# Patient Record
Sex: FEMALE | Race: WHITE | NOT HISPANIC OR LATINO | Employment: OTHER | URBAN - NONMETROPOLITAN AREA
[De-identification: names, ages, dates, MRNs, and addresses within clinical notes are randomized per-mention and may not be internally consistent; named-entity substitution may affect disease eponyms.]

---

## 2023-07-03 ENCOUNTER — HOSPITAL ENCOUNTER (INPATIENT)
Facility: HOSPITAL | Age: 48
LOS: 5 days | Discharge: HOME | DRG: 383 | End: 2023-07-08
Attending: EMERGENCY MEDICINE | Admitting: FAMILY MEDICINE
Payer: COMMERCIAL

## 2023-07-03 ENCOUNTER — APPOINTMENT (EMERGENCY)
Dept: RADIOLOGY | Facility: HOSPITAL | Age: 48
DRG: 383 | End: 2023-07-03
Payer: COMMERCIAL

## 2023-07-03 ENCOUNTER — APPOINTMENT (INPATIENT)
Dept: RADIOLOGY | Facility: HOSPITAL | Age: 48
DRG: 383 | End: 2023-07-03
Payer: COMMERCIAL

## 2023-07-03 DIAGNOSIS — R06.00 DYSPNEA: ICD-10-CM

## 2023-07-03 DIAGNOSIS — J96.22 ACUTE ON CHRONIC RESPIRATORY FAILURE WITH HYPERCAPNIA (HCC): ICD-10-CM

## 2023-07-03 DIAGNOSIS — L03.011 CELLULITIS OF RIGHT FINGER: ICD-10-CM

## 2023-07-03 DIAGNOSIS — J44.1 COPD EXACERBATION (HCC): Primary | ICD-10-CM

## 2023-07-03 DIAGNOSIS — I73.00 RAYNAUD'S DISEASE: ICD-10-CM

## 2023-07-03 LAB
2HR DELTA HS TROPONIN: 1 NG/L
ALBUMIN SERPL BCP-MCNC: 3.7 G/DL (ref 3.5–5)
ALP SERPL-CCNC: 54 U/L (ref 34–104)
ALT SERPL W P-5'-P-CCNC: 4 U/L (ref 7–52)
ANION GAP SERPL CALCULATED.3IONS-SCNC: 8 MMOL/L
APTT PPP: 28 SECONDS (ref 23–37)
AST SERPL W P-5'-P-CCNC: 24 U/L (ref 13–39)
BASE EX.OXY STD BLDV CALC-SCNC: 66.8 % (ref 60–80)
BASE EXCESS BLDV CALC-SCNC: 4.4 MMOL/L
BASOPHILS # BLD AUTO: 0.03 THOUSANDS/ÂΜL (ref 0–0.1)
BASOPHILS NFR BLD AUTO: 1 % (ref 0–1)
BILIRUB SERPL-MCNC: 0.36 MG/DL (ref 0.2–1)
BNP SERPL-MCNC: 133 PG/ML (ref 0–100)
BUN SERPL-MCNC: 16 MG/DL (ref 5–25)
CALCIUM SERPL-MCNC: 8.9 MG/DL (ref 8.4–10.2)
CARDIAC TROPONIN I PNL SERPL HS: 8 NG/L
CARDIAC TROPONIN I PNL SERPL HS: 9 NG/L
CHLORIDE SERPL-SCNC: 102 MMOL/L (ref 96–108)
CO2 SERPL-SCNC: 31 MMOL/L (ref 21–32)
CREAT SERPL-MCNC: 0.75 MG/DL (ref 0.6–1.3)
CRP SERPL QL: 7.8 MG/L
D DIMER PPP FEU-MCNC: 1.4 UG/ML FEU
EOSINOPHIL # BLD AUTO: 0.15 THOUSAND/ÂΜL (ref 0–0.61)
EOSINOPHIL NFR BLD AUTO: 3 % (ref 0–6)
ERYTHROCYTE [DISTWIDTH] IN BLOOD BY AUTOMATED COUNT: 19 % (ref 11.6–15.1)
FLUAV RNA RESP QL NAA+PROBE: NEGATIVE
FLUBV RNA RESP QL NAA+PROBE: NEGATIVE
GFR SERPL CREATININE-BSD FRML MDRD: 94 ML/MIN/1.73SQ M
GLUCOSE SERPL-MCNC: 151 MG/DL (ref 65–140)
HCO3 BLDV-SCNC: 32.8 MMOL/L (ref 24–30)
HCT VFR BLD AUTO: 49.5 % (ref 34.8–46.1)
HGB BLD-MCNC: 14.2 G/DL (ref 11.5–15.4)
IMM GRANULOCYTES # BLD AUTO: 0.02 THOUSAND/UL (ref 0–0.2)
IMM GRANULOCYTES NFR BLD AUTO: 0 % (ref 0–2)
INR PPP: 0.89 (ref 0.84–1.19)
LACTATE SERPL-SCNC: 1.6 MMOL/L (ref 0.5–2)
LYMPHOCYTES # BLD AUTO: 1.55 THOUSANDS/ÂΜL (ref 0.6–4.47)
LYMPHOCYTES NFR BLD AUTO: 31 % (ref 14–44)
MAGNESIUM SERPL-MCNC: 2.3 MG/DL (ref 1.9–2.7)
MCH RBC QN AUTO: 24.1 PG (ref 26.8–34.3)
MCHC RBC AUTO-ENTMCNC: 28.7 G/DL (ref 31.4–37.4)
MCV RBC AUTO: 84 FL (ref 82–98)
MONOCYTES # BLD AUTO: 0.46 THOUSAND/ÂΜL (ref 0.17–1.22)
MONOCYTES NFR BLD AUTO: 9 % (ref 4–12)
NEUTROPHILS # BLD AUTO: 2.83 THOUSANDS/ÂΜL (ref 1.85–7.62)
NEUTS SEG NFR BLD AUTO: 56 % (ref 43–75)
NRBC BLD AUTO-RTO: 0 /100 WBCS
O2 CT BLDV-SCNC: 13.9 ML/DL
PCO2 BLDV: 65.8 MM HG (ref 42–50)
PH BLDV: 7.32 [PH] (ref 7.3–7.4)
PLATELET # BLD AUTO: 123 THOUSANDS/UL (ref 149–390)
PO2 BLDV: 38.8 MM HG (ref 35–45)
POTASSIUM SERPL-SCNC: 4.3 MMOL/L (ref 3.5–5.3)
PROCALCITONIN SERPL-MCNC: <0.05 NG/ML
PROT SERPL-MCNC: 7.3 G/DL (ref 6.4–8.4)
PROTHROMBIN TIME: 12.1 SECONDS (ref 11.6–14.5)
RBC # BLD AUTO: 5.88 MILLION/UL (ref 3.81–5.12)
RSV RNA RESP QL NAA+PROBE: NEGATIVE
SARS-COV-2 RNA RESP QL NAA+PROBE: NEGATIVE
SODIUM SERPL-SCNC: 141 MMOL/L (ref 135–147)
WBC # BLD AUTO: 5.04 THOUSAND/UL (ref 4.31–10.16)

## 2023-07-03 PROCEDURE — 71045 X-RAY EXAM CHEST 1 VIEW: CPT

## 2023-07-03 PROCEDURE — 83605 ASSAY OF LACTIC ACID: CPT | Performed by: EMERGENCY MEDICINE

## 2023-07-03 PROCEDURE — 82805 BLOOD GASES W/O2 SATURATION: CPT | Performed by: EMERGENCY MEDICINE

## 2023-07-03 PROCEDURE — 96374 THER/PROPH/DIAG INJ IV PUSH: CPT

## 2023-07-03 PROCEDURE — 94640 AIRWAY INHALATION TREATMENT: CPT

## 2023-07-03 PROCEDURE — 87040 BLOOD CULTURE FOR BACTERIA: CPT | Performed by: EMERGENCY MEDICINE

## 2023-07-03 PROCEDURE — 0241U HB NFCT DS VIR RESP RNA 4 TRGT: CPT | Performed by: EMERGENCY MEDICINE

## 2023-07-03 PROCEDURE — 85025 COMPLETE CBC W/AUTO DIFF WBC: CPT | Performed by: EMERGENCY MEDICINE

## 2023-07-03 PROCEDURE — 84484 ASSAY OF TROPONIN QUANT: CPT | Performed by: EMERGENCY MEDICINE

## 2023-07-03 PROCEDURE — 86140 C-REACTIVE PROTEIN: CPT | Performed by: EMERGENCY MEDICINE

## 2023-07-03 PROCEDURE — 99285 EMERGENCY DEPT VISIT HI MDM: CPT | Performed by: EMERGENCY MEDICINE

## 2023-07-03 PROCEDURE — 99223 1ST HOSP IP/OBS HIGH 75: CPT

## 2023-07-03 PROCEDURE — 83880 ASSAY OF NATRIURETIC PEPTIDE: CPT | Performed by: EMERGENCY MEDICINE

## 2023-07-03 PROCEDURE — 80053 COMPREHEN METABOLIC PANEL: CPT | Performed by: EMERGENCY MEDICINE

## 2023-07-03 PROCEDURE — 36415 COLL VENOUS BLD VENIPUNCTURE: CPT | Performed by: EMERGENCY MEDICINE

## 2023-07-03 PROCEDURE — 99285 EMERGENCY DEPT VISIT HI MDM: CPT

## 2023-07-03 PROCEDURE — 85730 THROMBOPLASTIN TIME PARTIAL: CPT | Performed by: EMERGENCY MEDICINE

## 2023-07-03 PROCEDURE — 85379 FIBRIN DEGRADATION QUANT: CPT

## 2023-07-03 PROCEDURE — 84145 PROCALCITONIN (PCT): CPT | Performed by: EMERGENCY MEDICINE

## 2023-07-03 PROCEDURE — 73120 X-RAY EXAM OF HAND: CPT

## 2023-07-03 PROCEDURE — 93005 ELECTROCARDIOGRAM TRACING: CPT

## 2023-07-03 PROCEDURE — 85610 PROTHROMBIN TIME: CPT | Performed by: EMERGENCY MEDICINE

## 2023-07-03 PROCEDURE — 83735 ASSAY OF MAGNESIUM: CPT | Performed by: EMERGENCY MEDICINE

## 2023-07-03 RX ORDER — ACETAMINOPHEN 325 MG/1
650 TABLET ORAL EVERY 6 HOURS PRN
Status: DISCONTINUED | OUTPATIENT
Start: 2023-07-03 | End: 2023-07-08 | Stop reason: HOSPADM

## 2023-07-03 RX ORDER — SILDENAFIL CITRATE 20 MG/1
20 TABLET ORAL 3 TIMES DAILY
Status: DISCONTINUED | OUTPATIENT
Start: 2023-07-03 | End: 2023-07-08 | Stop reason: HOSPADM

## 2023-07-03 RX ORDER — METHYLPREDNISOLONE SODIUM SUCCINATE 125 MG/2ML
80 INJECTION, POWDER, LYOPHILIZED, FOR SOLUTION INTRAMUSCULAR; INTRAVENOUS ONCE
Status: COMPLETED | OUTPATIENT
Start: 2023-07-03 | End: 2023-07-03

## 2023-07-03 RX ORDER — SODIUM CHLORIDE FOR INHALATION 0.9 %
3 VIAL, NEBULIZER (ML) INHALATION
Status: DISCONTINUED | OUTPATIENT
Start: 2023-07-04 | End: 2023-07-04

## 2023-07-03 RX ORDER — HYDROXYCHLOROQUINE SULFATE 200 MG/1
100 TABLET, FILM COATED ORAL 2 TIMES DAILY
Status: DISCONTINUED | OUTPATIENT
Start: 2023-07-04 | End: 2023-07-04

## 2023-07-03 RX ORDER — INSULIN LISPRO 100 [IU]/ML
1-6 INJECTION, SOLUTION INTRAVENOUS; SUBCUTANEOUS
Status: DISCONTINUED | OUTPATIENT
Start: 2023-07-04 | End: 2023-07-08 | Stop reason: HOSPADM

## 2023-07-03 RX ORDER — ALBUTEROL SULFATE 90 UG/1
2 AEROSOL, METERED RESPIRATORY (INHALATION) EVERY 6 HOURS PRN
COMMUNITY

## 2023-07-03 RX ORDER — METHYLPREDNISOLONE SODIUM SUCCINATE 40 MG/ML
40 INJECTION, POWDER, LYOPHILIZED, FOR SOLUTION INTRAMUSCULAR; INTRAVENOUS EVERY 8 HOURS
Status: DISCONTINUED | OUTPATIENT
Start: 2023-07-04 | End: 2023-07-06

## 2023-07-03 RX ORDER — IPRATROPIUM BROMIDE AND ALBUTEROL SULFATE 2.5; .5 MG/3ML; MG/3ML
3 SOLUTION RESPIRATORY (INHALATION) ONCE
Status: COMPLETED | OUTPATIENT
Start: 2023-07-03 | End: 2023-07-03

## 2023-07-03 RX ORDER — AMLODIPINE BESYLATE 5 MG/1
5 TABLET ORAL DAILY
COMMUNITY

## 2023-07-03 RX ORDER — LEVALBUTEROL INHALATION SOLUTION 1.25 MG/3ML
1.25 SOLUTION RESPIRATORY (INHALATION)
Status: DISCONTINUED | OUTPATIENT
Start: 2023-07-04 | End: 2023-07-08 | Stop reason: HOSPADM

## 2023-07-03 RX ORDER — AMLODIPINE BESYLATE 5 MG/1
5 TABLET ORAL DAILY
Status: DISCONTINUED | OUTPATIENT
Start: 2023-07-04 | End: 2023-07-08 | Stop reason: HOSPADM

## 2023-07-03 RX ORDER — SILDENAFIL CITRATE 20 MG/1
20 TABLET ORAL 3 TIMES DAILY
COMMUNITY

## 2023-07-03 RX ORDER — GUAIFENESIN 600 MG/1
600 TABLET, EXTENDED RELEASE ORAL 2 TIMES DAILY
Status: DISCONTINUED | OUTPATIENT
Start: 2023-07-04 | End: 2023-07-08 | Stop reason: HOSPADM

## 2023-07-03 RX ORDER — HEPARIN SODIUM 5000 [USP'U]/ML
5000 INJECTION, SOLUTION INTRAVENOUS; SUBCUTANEOUS EVERY 8 HOURS SCHEDULED
Status: DISCONTINUED | OUTPATIENT
Start: 2023-07-04 | End: 2023-07-08 | Stop reason: HOSPADM

## 2023-07-03 RX ORDER — VANCOMYCIN HYDROCHLORIDE 1 G/200ML
15 INJECTION, SOLUTION INTRAVENOUS EVERY 12 HOURS
Status: DISCONTINUED | OUTPATIENT
Start: 2023-07-04 | End: 2023-07-04

## 2023-07-03 RX ORDER — NICOTINE 21 MG/24HR
1 PATCH, TRANSDERMAL 24 HOURS TRANSDERMAL DAILY
Status: DISCONTINUED | OUTPATIENT
Start: 2023-07-04 | End: 2023-07-07

## 2023-07-03 RX ORDER — OXYCODONE HYDROCHLORIDE 5 MG/1
5 TABLET ORAL EVERY 4 HOURS PRN
Status: DISCONTINUED | OUTPATIENT
Start: 2023-07-03 | End: 2023-07-06

## 2023-07-03 RX ORDER — CEFEPIME HYDROCHLORIDE 2 G/50ML
2000 INJECTION, SOLUTION INTRAVENOUS EVERY 12 HOURS
Status: DISCONTINUED | OUTPATIENT
Start: 2023-07-04 | End: 2023-07-07

## 2023-07-03 RX ADMIN — SILDENAFIL 20 MG: 20 TABLET ORAL at 23:57

## 2023-07-03 RX ADMIN — METHYLPREDNISOLONE SODIUM SUCCINATE 80 MG: 125 INJECTION, POWDER, FOR SOLUTION INTRAMUSCULAR; INTRAVENOUS at 20:51

## 2023-07-03 RX ADMIN — AZITHROMYCIN MONOHYDRATE 500 MG: 500 INJECTION, POWDER, LYOPHILIZED, FOR SOLUTION INTRAVENOUS at 23:13

## 2023-07-03 RX ADMIN — IPRATROPIUM BROMIDE AND ALBUTEROL SULFATE 3 ML: 2.5; .5 SOLUTION RESPIRATORY (INHALATION) at 20:51

## 2023-07-04 LAB
4HR DELTA HS TROPONIN: 0 NG/L
ALBUMIN SERPL BCP-MCNC: 3.5 G/DL (ref 3.5–5)
ALP SERPL-CCNC: 46 U/L (ref 34–104)
ALT SERPL W P-5'-P-CCNC: 4 U/L (ref 7–52)
ANION GAP SERPL CALCULATED.3IONS-SCNC: 6 MMOL/L
ANISOCYTOSIS BLD QL SMEAR: PRESENT
AST SERPL W P-5'-P-CCNC: 8 U/L (ref 13–39)
BACTERIA UR QL AUTO: ABNORMAL /HPF
BASE EX.OXY STD BLDV CALC-SCNC: 89.4 % (ref 60–80)
BASE EXCESS BLDV CALC-SCNC: 0.6 MMOL/L
BASOPHILS # BLD MANUAL: 0 THOUSAND/UL (ref 0–0.1)
BASOPHILS NFR MAR MANUAL: 0 % (ref 0–1)
BILIRUB SERPL-MCNC: 0.25 MG/DL (ref 0.2–1)
BILIRUB UR QL STRIP: NEGATIVE
BUN SERPL-MCNC: 15 MG/DL (ref 5–25)
CALCIUM SERPL-MCNC: 8.9 MG/DL (ref 8.4–10.2)
CARDIAC TROPONIN I PNL SERPL HS: 8 NG/L
CHLORIDE SERPL-SCNC: 104 MMOL/L (ref 96–108)
CLARITY UR: CLEAR
CO2 SERPL-SCNC: 27 MMOL/L (ref 21–32)
COLOR UR: YELLOW
CREAT SERPL-MCNC: 0.63 MG/DL (ref 0.6–1.3)
EOSINOPHIL # BLD MANUAL: 0 THOUSAND/UL (ref 0–0.4)
EOSINOPHIL NFR BLD MANUAL: 0 % (ref 0–6)
ERYTHROCYTE [DISTWIDTH] IN BLOOD BY AUTOMATED COUNT: 18.1 % (ref 11.6–15.1)
GFR SERPL CREATININE-BSD FRML MDRD: 106 ML/MIN/1.73SQ M
GLUCOSE SERPL-MCNC: 151 MG/DL (ref 65–140)
GLUCOSE SERPL-MCNC: 204 MG/DL (ref 65–140)
GLUCOSE SERPL-MCNC: 211 MG/DL (ref 65–140)
GLUCOSE SERPL-MCNC: 213 MG/DL (ref 65–140)
GLUCOSE SERPL-MCNC: 257 MG/DL (ref 65–140)
GLUCOSE SERPL-MCNC: 302 MG/DL (ref 65–140)
GLUCOSE UR STRIP-MCNC: ABNORMAL MG/DL
HCO3 BLDV-SCNC: 28.8 MMOL/L (ref 24–30)
HCT VFR BLD AUTO: 44.5 % (ref 34.8–46.1)
HGB BLD-MCNC: 12.9 G/DL (ref 11.5–15.4)
HGB UR QL STRIP.AUTO: NEGATIVE
HYALINE CASTS #/AREA URNS LPF: ABNORMAL /LPF
KETONES UR STRIP-MCNC: NEGATIVE MG/DL
LEUKOCYTE ESTERASE UR QL STRIP: NEGATIVE
LG PLATELETS BLD QL SMEAR: PRESENT
LYMPHOCYTES # BLD AUTO: 0.28 THOUSAND/UL (ref 0.6–4.47)
LYMPHOCYTES # BLD AUTO: 7 % (ref 14–44)
MCH RBC QN AUTO: 24.4 PG (ref 26.8–34.3)
MCHC RBC AUTO-ENTMCNC: 29 G/DL (ref 31.4–37.4)
MCV RBC AUTO: 84 FL (ref 82–98)
MICROCYTES BLD QL AUTO: PRESENT
MONOCYTES # BLD AUTO: 0.04 THOUSAND/UL (ref 0–1.22)
MONOCYTES NFR BLD: 1 % (ref 4–12)
MUCOUS THREADS UR QL AUTO: ABNORMAL
NEUTROPHILS # BLD MANUAL: 3.65 THOUSAND/UL (ref 1.85–7.62)
NEUTS SEG NFR BLD AUTO: 92 % (ref 43–75)
NITRITE UR QL STRIP: POSITIVE
NON-SQ EPI CELLS URNS QL MICRO: ABNORMAL /HPF
O2 CT BLDV-SCNC: 17.7 ML/DL
PCO2 BLDV: 61.9 MM HG (ref 42–50)
PH BLDV: 7.29 [PH] (ref 7.3–7.4)
PH UR STRIP.AUTO: 5.5 [PH]
PLATELET # BLD AUTO: 199 THOUSANDS/UL (ref 149–390)
PLATELET BLD QL SMEAR: ADEQUATE
PMV BLD AUTO: 9.4 FL (ref 8.9–12.7)
PO2 BLDV: 69.4 MM HG (ref 35–45)
POTASSIUM SERPL-SCNC: 3.9 MMOL/L (ref 3.5–5.3)
PROCALCITONIN SERPL-MCNC: <0.05 NG/ML
PROT SERPL-MCNC: 6.5 G/DL (ref 6.4–8.4)
PROT UR STRIP-MCNC: ABNORMAL MG/DL
RBC # BLD AUTO: 5.28 MILLION/UL (ref 3.81–5.12)
RBC #/AREA URNS AUTO: ABNORMAL /HPF
RBC MORPH BLD: PRESENT
SODIUM SERPL-SCNC: 137 MMOL/L (ref 135–147)
SP GR UR STRIP.AUTO: >=1.03 (ref 1–1.03)
UROBILINOGEN UR QL STRIP.AUTO: 0.2 E.U./DL
WBC # BLD AUTO: 3.97 THOUSAND/UL (ref 4.31–10.16)
WBC #/AREA URNS AUTO: ABNORMAL /HPF
WBC TOXIC VACUOLES BLD QL SMEAR: PRESENT

## 2023-07-04 PROCEDURE — 80053 COMPREHEN METABOLIC PANEL: CPT

## 2023-07-04 PROCEDURE — 94640 AIRWAY INHALATION TREATMENT: CPT

## 2023-07-04 PROCEDURE — 84484 ASSAY OF TROPONIN QUANT: CPT | Performed by: EMERGENCY MEDICINE

## 2023-07-04 PROCEDURE — 99233 SBSQ HOSP IP/OBS HIGH 50: CPT | Performed by: FAMILY MEDICINE

## 2023-07-04 PROCEDURE — 87205 SMEAR GRAM STAIN: CPT

## 2023-07-04 PROCEDURE — 82948 REAGENT STRIP/BLOOD GLUCOSE: CPT

## 2023-07-04 PROCEDURE — 84145 PROCALCITONIN (PCT): CPT

## 2023-07-04 PROCEDURE — 94660 CPAP INITIATION&MGMT: CPT

## 2023-07-04 PROCEDURE — 85027 COMPLETE CBC AUTOMATED: CPT

## 2023-07-04 PROCEDURE — 87186 SC STD MICRODIL/AGAR DIL: CPT | Performed by: EMERGENCY MEDICINE

## 2023-07-04 PROCEDURE — 87077 CULTURE AEROBIC IDENTIFY: CPT | Performed by: EMERGENCY MEDICINE

## 2023-07-04 PROCEDURE — 87081 CULTURE SCREEN ONLY: CPT

## 2023-07-04 PROCEDURE — 82805 BLOOD GASES W/O2 SATURATION: CPT

## 2023-07-04 PROCEDURE — 94664 DEMO&/EVAL PT USE INHALER: CPT

## 2023-07-04 PROCEDURE — 94760 N-INVAS EAR/PLS OXIMETRY 1: CPT

## 2023-07-04 PROCEDURE — 81001 URINALYSIS AUTO W/SCOPE: CPT | Performed by: EMERGENCY MEDICINE

## 2023-07-04 PROCEDURE — 85007 BL SMEAR W/DIFF WBC COUNT: CPT

## 2023-07-04 PROCEDURE — 99255 IP/OBS CONSLTJ NEW/EST HI 80: CPT | Performed by: PHYSICIAN ASSISTANT

## 2023-07-04 PROCEDURE — 87070 CULTURE OTHR SPECIMN AEROBIC: CPT

## 2023-07-04 PROCEDURE — 87086 URINE CULTURE/COLONY COUNT: CPT | Performed by: EMERGENCY MEDICINE

## 2023-07-04 RX ORDER — CALCIUM CARBONATE 500 MG/1
500 TABLET, CHEWABLE ORAL DAILY PRN
Status: DISCONTINUED | OUTPATIENT
Start: 2023-07-04 | End: 2023-07-04

## 2023-07-04 RX ORDER — HYDROXYCHLOROQUINE SULFATE 200 MG/1
100 TABLET, FILM COATED ORAL 2 TIMES DAILY
Status: DISCONTINUED | OUTPATIENT
Start: 2023-07-04 | End: 2023-07-08 | Stop reason: HOSPADM

## 2023-07-04 RX ORDER — IPRATROPIUM BROMIDE AND ALBUTEROL SULFATE 2.5; .5 MG/3ML; MG/3ML
3 SOLUTION RESPIRATORY (INHALATION) EVERY 6 HOURS PRN
Status: DISCONTINUED | OUTPATIENT
Start: 2023-07-04 | End: 2023-07-08 | Stop reason: HOSPADM

## 2023-07-04 RX ORDER — INSULIN LISPRO 100 [IU]/ML
5 INJECTION, SOLUTION INTRAVENOUS; SUBCUTANEOUS
Status: DISCONTINUED | OUTPATIENT
Start: 2023-07-04 | End: 2023-07-07

## 2023-07-04 RX ORDER — OFLOXACIN 3 MG/ML
1 SOLUTION/ DROPS OPHTHALMIC 4 TIMES DAILY
Status: DISCONTINUED | OUTPATIENT
Start: 2023-07-04 | End: 2023-07-08 | Stop reason: HOSPADM

## 2023-07-04 RX ORDER — LIDOCAINE 50 MG/G
1 PATCH TOPICAL DAILY
Status: DISCONTINUED | OUTPATIENT
Start: 2023-07-04 | End: 2023-07-08 | Stop reason: HOSPADM

## 2023-07-04 RX ORDER — SIMETHICONE 80 MG
80 TABLET,CHEWABLE ORAL EVERY 6 HOURS PRN
Status: DISCONTINUED | OUTPATIENT
Start: 2023-07-04 | End: 2023-07-08 | Stop reason: HOSPADM

## 2023-07-04 RX ORDER — HYDROMORPHONE HCL IN WATER/PF 6 MG/30 ML
0.2 PATIENT CONTROLLED ANALGESIA SYRINGE INTRAVENOUS ONCE
Status: COMPLETED | OUTPATIENT
Start: 2023-07-04 | End: 2023-07-04

## 2023-07-04 RX ADMIN — CEFEPIME HYDROCHLORIDE 2000 MG: 2 INJECTION, SOLUTION INTRAVENOUS at 00:44

## 2023-07-04 RX ADMIN — OFLOXACIN 1 DROP: 3 SOLUTION OPHTHALMIC at 22:38

## 2023-07-04 RX ADMIN — GUAIFENESIN 600 MG: 600 TABLET ORAL at 08:03

## 2023-07-04 RX ADMIN — SILDENAFIL 20 MG: 20 TABLET ORAL at 08:03

## 2023-07-04 RX ADMIN — CALCIUM CARBONATE (ANTACID) CHEW TAB 500 MG 500 MG: 500 CHEW TAB at 17:03

## 2023-07-04 RX ADMIN — NICOTINE 1 PATCH: 14 PATCH, EXTENDED RELEASE TRANSDERMAL at 08:03

## 2023-07-04 RX ADMIN — LEVALBUTEROL HYDROCHLORIDE 1.25 MG: 1.25 SOLUTION RESPIRATORY (INHALATION) at 13:51

## 2023-07-04 RX ADMIN — IOHEXOL 100 ML: 350 INJECTION, SOLUTION INTRAVENOUS at 03:37

## 2023-07-04 RX ADMIN — INSULIN LISPRO 2 UNITS: 100 INJECTION, SOLUTION INTRAVENOUS; SUBCUTANEOUS at 16:54

## 2023-07-04 RX ADMIN — SILDENAFIL 20 MG: 20 TABLET ORAL at 22:24

## 2023-07-04 RX ADMIN — AMLODIPINE BESYLATE 5 MG: 5 TABLET ORAL at 08:03

## 2023-07-04 RX ADMIN — OXYCODONE HYDROCHLORIDE 5 MG: 5 TABLET ORAL at 18:19

## 2023-07-04 RX ADMIN — INSULIN LISPRO 3 UNITS: 100 INJECTION, SOLUTION INTRAVENOUS; SUBCUTANEOUS at 00:46

## 2023-07-04 RX ADMIN — HEPARIN SODIUM 5000 UNITS: 5000 INJECTION INTRAVENOUS; SUBCUTANEOUS at 22:29

## 2023-07-04 RX ADMIN — VANCOMYCIN HYDROCHLORIDE 1000 MG: 1 INJECTION, SOLUTION INTRAVENOUS at 01:26

## 2023-07-04 RX ADMIN — HEPARIN SODIUM 5000 UNITS: 5000 INJECTION INTRAVENOUS; SUBCUTANEOUS at 05:31

## 2023-07-04 RX ADMIN — LEVALBUTEROL HYDROCHLORIDE 1.25 MG: 1.25 SOLUTION RESPIRATORY (INHALATION) at 07:31

## 2023-07-04 RX ADMIN — METHYLPREDNISOLONE SODIUM SUCCINATE 40 MG: 40 INJECTION, POWDER, FOR SOLUTION INTRAMUSCULAR; INTRAVENOUS at 22:26

## 2023-07-04 RX ADMIN — METHYLPREDNISOLONE SODIUM SUCCINATE 40 MG: 40 INJECTION, POWDER, FOR SOLUTION INTRAMUSCULAR; INTRAVENOUS at 05:28

## 2023-07-04 RX ADMIN — HEPARIN SODIUM 5000 UNITS: 5000 INJECTION INTRAVENOUS; SUBCUTANEOUS at 12:59

## 2023-07-04 RX ADMIN — SIMETHICONE 80 MG: 80 TABLET, CHEWABLE ORAL at 18:26

## 2023-07-04 RX ADMIN — INSULIN LISPRO 1 UNITS: 100 INJECTION, SOLUTION INTRAVENOUS; SUBCUTANEOUS at 22:38

## 2023-07-04 RX ADMIN — HYDROXYCHLOROQUINE SULFATE 100 MG: 200 TABLET, FILM COATED ORAL at 09:50

## 2023-07-04 RX ADMIN — METHYLPREDNISOLONE SODIUM SUCCINATE 40 MG: 40 INJECTION, POWDER, FOR SOLUTION INTRAMUSCULAR; INTRAVENOUS at 12:11

## 2023-07-04 RX ADMIN — VANCOMYCIN HYDROCHLORIDE 1250 MG: 5 INJECTION, POWDER, LYOPHILIZED, FOR SOLUTION INTRAVENOUS at 09:50

## 2023-07-04 RX ADMIN — OXYCODONE HYDROCHLORIDE 5 MG: 5 TABLET ORAL at 07:19

## 2023-07-04 RX ADMIN — GUAIFENESIN 600 MG: 600 TABLET ORAL at 16:52

## 2023-07-04 RX ADMIN — HYDROMORPHONE HYDROCHLORIDE 0.2 MG: 0.2 INJECTION, SOLUTION INTRAMUSCULAR; INTRAVENOUS; SUBCUTANEOUS at 19:19

## 2023-07-04 RX ADMIN — INSULIN LISPRO 5 UNITS: 100 INJECTION, SOLUTION INTRAVENOUS; SUBCUTANEOUS at 16:55

## 2023-07-04 RX ADMIN — LIDOCAINE 1 PATCH: 50 PATCH CUTANEOUS at 16:10

## 2023-07-04 RX ADMIN — VANCOMYCIN HYDROCHLORIDE 1250 MG: 5 INJECTION, POWDER, LYOPHILIZED, FOR SOLUTION INTRAVENOUS at 22:28

## 2023-07-04 RX ADMIN — OXYCODONE HYDROCHLORIDE 5 MG: 5 TABLET ORAL at 00:06

## 2023-07-04 RX ADMIN — OXYCODONE HYDROCHLORIDE 5 MG: 5 TABLET ORAL at 14:26

## 2023-07-04 RX ADMIN — SILDENAFIL 20 MG: 20 TABLET ORAL at 15:33

## 2023-07-04 RX ADMIN — INSULIN LISPRO 2 UNITS: 100 INJECTION, SOLUTION INTRAVENOUS; SUBCUTANEOUS at 08:04

## 2023-07-04 RX ADMIN — OFLOXACIN 1 DROP: 3 SOLUTION OPHTHALMIC at 17:03

## 2023-07-04 RX ADMIN — HYDROXYCHLOROQUINE SULFATE 100 MG: 200 TABLET, FILM COATED ORAL at 16:55

## 2023-07-04 RX ADMIN — LEVALBUTEROL HYDROCHLORIDE 1.25 MG: 1.25 SOLUTION RESPIRATORY (INHALATION) at 20:29

## 2023-07-04 RX ADMIN — CEFEPIME HYDROCHLORIDE 2000 MG: 2 INJECTION, SOLUTION INTRAVENOUS at 11:29

## 2023-07-04 RX ADMIN — IPRATROPIUM BROMIDE AND ALBUTEROL SULFATE 3 ML: .5; 3 SOLUTION RESPIRATORY (INHALATION) at 09:55

## 2023-07-04 RX ADMIN — INSULIN LISPRO 4 UNITS: 100 INJECTION, SOLUTION INTRAVENOUS; SUBCUTANEOUS at 11:00

## 2023-07-04 NOTE — ASSESSMENT & PLAN NOTE
· Presents from home with shortness of breath . Per EMS received multiple nebulizer treatments pre hospital. Chronic respiratory failure on 3l NC at baseline. · Currently stable on baseline O2 3L   · VBG pH 7.31, CO2 65.8 (appears similar to ABG in June 2023 likely near baseline)   · Repeat VBG shows respiratory acidosis compensating.   · XR chest pending final read   · Discussed the case with on-call pulmonologist-recommending to place patient on BiPAP during sleeping hour with 16/8-continue IV steroid-pending formal discussion  · Critical care aware about this patient-since patient will need only BiPAP during sleep, does not recommends to upgrade level of care

## 2023-07-04 NOTE — PROGRESS NOTES
[address] [facility]  Progress Note  Name: Sruthi Martin  MRN: [id number]  Unit/Bed#: -01 I Date of Admission: 7/3/2023   Date of Service: 7/4/2023 I Hospital Day: 1    Assessment/Plan   Cellulitis of right index finger  Assessment & Plan  · Complicated history of Raynauds , smoking, multiple finger amputations in the past. Reportedly had partial amputation of right 2nd finger in [place] around May 2023. Recently admitted at [facility] in June 2023, had evaluation of right 2nd finger discoloration , appears was evaluated by Rheum and Orthopedics . No surgical intervention was recommended . Patient states she had follow-up outpatient appointment with orthopedics but was unable to get to her appointment    · Right 2nd finger with black/brown discoloration at the tip . No drainage noted. Mild swelling and erythema on exam (erythema appears similar to other fingers due to Raynaud's). Complains of pain that is not new or worsening. ·  Afebrile, no leukocytosis. · X-ray hand:Mildly comminuted fracture at the distal tuft of the right second digit. Erosive/resorptive changes of the distal tuft of the right fourth digit which may reflect phalangeal osteolysis given history of Raynaud's disease  · Appreciate orthopedics consult -recommendation is to transfer to tertiary center in the near future for hand surgery evaluation. As per chart review, patient recently evaluated at [facility], patient condition improved for blood culture shows bacteremia, at that time we will try to initiate transfer. The meantime we will try to stabilize patient COPD  · F/u blood cultures- Start cefepime and vancomyin for now   · Prn pain control  · Neurovascular checks     * Acute exacerbation of chronic obstructive pulmonary disease (COPD) ([address])  Assessment & Plan  · Presents from home with shortness of breath .  Per EMS received multiple nebulizer treatments pre hospital. Chronic respiratory failure on 3l NC at baseline. · Currently stable on baseline O2 3L   · VBG pH 7.31, CO2 65.8 (appears similar to ABG in June 2023 likely near baseline)   · Repeat VBG shows respiratory acidosis compensating. · XR chest pending final read   · Discussed the case with on-call pulmonologist-recommending to place patient on BiPAP during sleeping hour with 16/8-continue IV steroid-pending formal discussion  · Critical care aware about this patient-since patient will need only BiPAP during sleep, does not recommends to upgrade level of care    Chest pain  Assessment & Plan  · Complaining of chest tightness and shortness of breath  · Troponins negative thus far   · EKG nonischemic   · Recent echo June 2023 shows EF 55%  · Likely in the setting of acute COPD exacerbation   · CTAP no PE noted    Thrombocytopenia (HCC)  Assessment & Plan  · Platelet improving  · No active bleeding noted    Pre-diabetes  Assessment & Plan  · Reports now off Metformin   · Place on SSI while inpatient , monitor closely while on IV steroids     Chronic respiratory failure ([address])  Assessment & Plan  · Currently stable at baseline 3l NC  · In the setting of COPD   · Monitor respiratory status closely, continue treatment for acute COPD         Lupus ([address])  Assessment & Plan  · Recently moved from [facility] she is working on establishing care here   · Continue Plaquenil for now, monitor Platelets closely     Raynaud's disease  Assessment & Plan  · Current smoker , has had multiple fingers amputated in the past   · States hands fluctuate color, no new or worsening pain per patient  · Continue amlodipine , Sildenafil   · Smoking cessation encouraged   · Neurovascular checks   · We will try to switch amlodipine to nifedipine which also helps with Raynaud's             VTE Pharmacologic Prophylaxis: VTE Score: 5 High Risk (Score >/= 5) - Pharmacological DVT Prophylaxis Ordered: heparin. Sequential Compression Devices Ordered.     Patient Centered Rounds: I performed bedside rounds with nursing staff today. Discussions with Specialists or Other Care Team Provider: Orthopedic, pulmonary over the phone    Education and Discussions with Family / Patient: Updated  (son) via phone.-Left voice message for son. Total Time Spent on Date of Encounter in care of patient: 10 minutes This time was spent on one or more of the following: performing physical exam; counseling and coordination of care; obtaining or reviewing history; documenting in the medical record; reviewing/ordering tests, medications or procedures; communicating with other healthcare professionals and discussing with patient's family/caregivers. Current Length of Stay: 1 day(s)  Current Patient Status: Inpatient   Certification Statement: The patient will continue to require additional inpatient hospital stay due to To monitor above condition  Discharge Plan: Anticipate discharge in >72 hrs to To be due to mild    Code Status: Level 3 - DNAR and DNI    Subjective:   Seen and evaluated and examined. Patient lying on the bed, having some labored breathing after recent walk to the restroom. Denies any chest pain, nausea, vomiting. Denies any significant pain at this time in the hands or feet. But reports on and off basis especially during cold weather she feels more pain. Objective:     Vitals:   Temp (24hrs), Av.3 °F (36.8 °C), Min:97.8 °F (36.6 °C), Max:98.8 °F (37.1 °C)    Temp:  [97.8 °F (36.6 °C)-98.8 °F (37.1 °C)] 98.8 °F (37.1 °C)  HR:  [] 104  Resp:  [18-22] 18  BP: (120-155)/(70-87) 120/70  SpO2:  [91 %-96 %] 93 %  Body mass index is 32.35 kg/m². Input and Output Summary (last 24 hours): Intake/Output Summary (Last 24 hours) at 2023 1513  Last data filed at 2023 1409  Gross per 24 hour   Intake 800 ml   Output 1150 ml   Net -350 ml       Physical Exam:   Physical Exam  Vitals and nursing note reviewed. Exam conducted with a chaperone present.    Constitutional: General: She is in acute distress. Appearance: She is not diaphoretic. HENT:      Head: Normocephalic. Nose: Nose normal. No congestion or rhinorrhea. Mouth/Throat:      Mouth: Mucous membranes are moist.   Eyes:      General: No scleral icterus. Left eye: No discharge. Extraocular Movements: Extraocular movements intact. Conjunctiva/sclera: Conjunctivae normal.      Pupils: Pupils are equal, round, and reactive to light. Cardiovascular:      Rate and Rhythm: Tachycardia present. Heart sounds: No murmur heard. No friction rub. No gallop. Pulmonary:      Effort: Respiratory distress present. Breath sounds: No stridor. Rales present. No rhonchi. Abdominal:      General: Abdomen is flat. Bowel sounds are normal. There is no distension. Palpations: There is no mass. Tenderness: There is no abdominal tenderness. Hernia: No hernia is present. Musculoskeletal:      Comments: Dry gangrene noted on the tip of the index finger on the right hand. Skin:     Comments: Maller rash noted bilaterally   Neurological:      General: No focal deficit present. Mental Status: She is alert and oriented to person, place, and time. Cranial Nerves: No cranial nerve deficit. Sensory: No sensory deficit. Motor: No weakness.       Coordination: Coordination normal.         Additional Data:     Labs:  Results from last 7 days   Lab Units 07/04/23  0524 07/03/23  2040   WBC Thousand/uL 3.97* 5.04   HEMOGLOBIN g/dL 12.9 14.2   HEMATOCRIT % 44.5 49.5*   PLATELETS Thousands/uL 199 123*   NEUTROS PCT %  --  56   LYMPHS PCT %  --  31   LYMPHO PCT % 7*  --    MONOS PCT %  --  9   MONO PCT % 1*  --    EOS PCT % 0 3     Results from last 7 days   Lab Units 07/04/23  0524   SODIUM mmol/L 137   POTASSIUM mmol/L 3.9   CHLORIDE mmol/L 104   CO2 mmol/L 27   BUN mg/dL 15   CREATININE mg/dL 0.63   ANION GAP mmol/L 6   CALCIUM mg/dL 8.9   ALBUMIN g/dL 3.5   TOTAL BILIRUBIN mg/dL 0.25   ALK PHOS U/L 46   ALT U/L 4*   AST U/L 8*   GLUCOSE RANDOM mg/dL 213*     Results from last 7 days   Lab Units 07/03/23  2040   INR  0.89     Results from last 7 days   Lab Units 07/04/23  1048 07/04/23  0711 07/04/23  0019   POC GLUCOSE mg/dl 302* 204* 257*         Results from last 7 days   Lab Units 07/04/23  0524 07/03/23  2040   LACTIC ACID mmol/L  --  1.6   PROCALCITONIN ng/ml <0.05 <0.05       Lines/Drains:  Invasive Devices     Peripheral Intravenous Line  Duration           Peripheral IV 07/03/23 Left Antecubital <1 day                      Imaging: Reviewed radiology reports from this admission including: chest CT scan and xray(s)    Recent Cultures (last 7 days):         Last 24 Hours Medication List:   Current Facility-Administered Medications   Medication Dose Route Frequency Provider Last Rate   • acetaminophen  650 mg Oral Q6H PRN SAIDA Jane     • amLODIPine  5 mg Oral Daily SAIDA Jane     • cefepime  2,000 mg Intravenous Q12H SAIDA Jane 2,000 mg (07/04/23 1129)   • guaiFENesin  600 mg Oral BID SAIDA Jane     • heparin (porcine)  5,000 Units Subcutaneous Q8H [facility] SAIDA Jane     • hydroxychloroquine  100 mg Oral BID Malorie Wade MD     • insulin lispro  1-6 Units Subcutaneous TID [facility] SAIDA Jane     • insulin lispro  1-6 Units Subcutaneous HS SAIDA Jane     • insulin lispro  5 Units Subcutaneous TID With Meals Malorie Wade MD     • ipratropium-albuterol  3 mL Nebulization Q6H PRN Fara Piper MD     • levalbuterol  1.25 mg Nebulization TID SAIDA Jane     • methylPREDNISolone sodium succinate  40 mg Intravenous Q8H SAIDA Jane     • nicotine  1 patch Transdermal Daily SAIDA Jane     • oxyCODONE  5 mg Oral Q4H PRN SAIDA Jane     • sildenafil  20 mg Oral TID Paty Villarreal SAIDA Burroughs     • vancomycin  1,250 mg Intravenous Q12H Walker You MD 1,250 mg (07/04/23 3442)        Today, Patient Was Seen By: Walker You MD    **Please Note: This note may have been constructed using a voice recognition system. **

## 2023-07-04 NOTE — NURSING NOTE
Patient states the tums did not help her heartburn. She also states the lidoderm patch did not help her right finger pain. Dr. Maame Caruso made aware.

## 2023-07-04 NOTE — ASSESSMENT & PLAN NOTE
· Recently moved from [facility] [place] [facility] she is working on establishing care here   · Continue Plaquenil for now, monitor Platelets closely

## 2023-07-04 NOTE — ASSESSMENT & PLAN NOTE
· Presents from home with shortness of breath . Per EMS received multiple nebulizer treatments pre hospital. Chronic respiratory failure on 3l NC at baseline. · Currently stable on baseline O2 3L   · VBG pH 7.31, CO2 65.8 (appears similar to ABG in June 2023 likely near baseline)   · XR chest pending final read   · Appreciate pulmonary consult   · Continue IV Solu-medrol every 8 hours, Xopenex TID.    · Continue Cefepime/Vancomycin for now (due to possible right finger cellulitis - see plan)   · Repeat VBG in AM   · Pulmonary toilet  · Smoking cessation encouraged

## 2023-07-04 NOTE — NURSING NOTE
Respiratory therapist notified that the patient became SOB after ambulating to the BR with the O2 on. She was not due for any further treatments at this time. Respiratory therapist will adjust orders, and bring her a treatment now. Will have the patient use the [facility] when she needs to toilet.

## 2023-07-04 NOTE — ASSESSMENT & PLAN NOTE
· Complaining of chest tightness and shortness of breath  · Troponins negative thus far   · EKG nonischemic   · Recent echo June 2023 shows EF 55%  · Likely in the setting of acute COPD exacerbation   · Check d-dimer- consider CTA pe study if elevated

## 2023-07-04 NOTE — ASSESSMENT & PLAN NOTE
· Complicated history of Raynauds , smoking, multiple finger amputations in the past. Reportedly had partial amputation of right 2nd finger in [place] around May 2023. Recently admitted at [facility] in June 2023, had evaluation of right 2nd finger discoloration , appears was evaluated by Rheum and Orthopedics . No surgical intervention was recommended . Patient states she had follow-up outpatient appointment with orthopedics but was unable to get to her appointment    · Right 2nd finger with black/brown discoloration at the tip . No drainage noted. Mild swelling and erythema on exam (erythema appears similar to other fingers due to Raynaud's). Complains of pain that is not new or worsening. ·  Afebrile, no leukocytosis. · X-ray hand:Mildly comminuted fracture at the distal tuft of the right second digit. Erosive/resorptive changes of the distal tuft of the right fourth digit which may reflect phalangeal osteolysis given history of Raynaud's disease  · Appreciate orthopedics consult -recommendation is to transfer to tertiary center in the near future for hand surgery evaluation. As per chart review, patient recently evaluated at [facility], patient condition improved for blood culture shows bacteremia, at that time we will try to initiate transfer.   The meantime we will try to stabilize patient COPD  · F/u blood cultures- Start cefepime and vancomyin for now   · Prn pain control  · Neurovascular checks

## 2023-07-04 NOTE — ASSESSMENT & PLAN NOTE
· Complicated history of Raynauds , smoking, multiple finger amputations in the past. Reportedly had partial amputation of right 2nd finger in [place] around May 2023. Recently admitted at [facility] in June 2023, had evaluation of right 2nd finger discoloration , appears was evaluated by Rheum and Orthopedics . No surgical intervention was recommended . Patient states she had follow-up outpatient appointment with orthopedics but was unable to get to her appointment    · Right 2nd finger with black/brown discoloration at the tip . No drainage noted. Mild swelling and erythema on exam (erythema appears similar to other fingers due to Raynaud's). Complains of pain that is not new or worsening. ·  Afebrile, no leukocytosis.     · Check XR right hand to rule out gas or osteomyelitis   · Appreciate orthopedics consult    · F/u blood cultures- Start cefepime and vancomyin for now   · Prn pain control  · Neurovascular checks

## 2023-07-04 NOTE — PROGRESS NOTES
Jaren Metz is a 50 y.o. female who is currently ordered Vancomycin IV with management by the Pharmacy Consult service. Relevant clinical data and objective / subjective history reviewed. Vancomycin Assessment:  Indication and Goal AUC/Trough: Soft tissue (goal -600, trough >10)  Clinical Status: stable  Micro:     Renal Function:  SCr: 0.63 mg/dL  CrCl: 103.1 mL/min  Renal replacement: Not on dialysis  Days of Therapy: 1  Current Dose: 1000 mg IV every 12 hours  Vancomycin Plan:  New Dosin mg IV every 12 hours  Estimated AUC: 504 mcg*hr/mL  Estimated Trough: 14.8 mcg/mL  Next Level: 23 @ 0600  Renal Function Monitoring: Daily BMP and [place] will continue to follow closely for s/sx of nephrotoxicity, infusion reactions and appropriateness of therapy. BMP and CBC will be ordered per protocol. We will continue to follow the patient’s culture results and clinical progress daily.     John Schofield, Pharmacist

## 2023-07-04 NOTE — ED PROVIDER NOTES
History  Chief Complaint   Patient presents with   • Shortness of Breath     Pt reports not feeling well all day, and the shortness of breath starting this morning. Pt with hx of copd, and uses 3L at home chronically, was still feeling short of breath. Pt reports using inhaler at home with no relief. Pt given 3 duo nebs and 40mg of Solu-medrol by ems PTA     Is a 59-year-old female with a history of COPD lupus and Raynaud's who continues to smoke chronic O2 dependence at home presents to the emergency department complaining of not feeling well and shortness of breath has been going on all day and progressively worsening patient given 3 DuoNebs prehospital for respiratory distress and 40 mg of Solu-Medrol by EMS. Patient having some improvement with neb treatments but still short of breath and wheezing. Patient admits to some chest discomfort and tightness no fevers or chills does have a cough. History provided by:  Patient and EMS personnel  Shortness of Breath  Severity:  Moderate  Onset quality:  Gradual  Duration:  1 day  Timing:  Constant  Progression:  Worsening  Chronicity:  Recurrent  Associated symptoms: cough and wheezing    Associated symptoms: no abdominal pain, no chest pain, no ear pain, no fever, no headaches, no rash, no sore throat and no vomiting        Prior to Admission Medications   Prescriptions Last Dose Informant Patient Reported? Taking?    Hydroxychloroquine Sulfate (PLAQUENIL PO)   Yes Yes   Sig: Take 100 mg by mouth 2 (two) times a day   albuterol (PROVENTIL HFA,VENTOLIN HFA) 90 mcg/act inhaler   Yes Yes   Sig: Inhale 2 puffs every 6 (six) hours as needed for wheezing   amLODIPine (NORVASC) 5 mg tablet   Yes Yes   Sig: Take 5 mg by mouth daily   sildenafil (REVATIO) 20 mg tablet Unknown  Yes No   Sig: Take 20 mg by mouth 3 (three) times a day      Facility-Administered Medications: None       Past Medical History:   Diagnosis Date   • Borderline diabetic    • COPD (chronic obstructive pulmonary disease) ([address] )    • Lupus (HCC)    • 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 No pertinent family history. I have reviewed and agree with the history as documented. E-Cigarette/Vaping   • E-Cigarette Use Never User      E-Cigarette/Vaping Substances     Social History     Tobacco Use   • Smoking status: Every Day     Packs/day: 0.50     Types: Cigarettes   • Smokeless tobacco: Never   Vaping Use   • Vaping Use: Never used   Substance Use Topics   • Alcohol use: Never   • Drug use: Never       Review of Systems   Constitutional: Negative for activity change, appetite change, chills, fatigue and fever. HENT: Negative for congestion, ear pain, rhinorrhea and sore throat. Eyes: Negative for discharge, redness and visual disturbance. Respiratory: Positive for cough, chest tightness, shortness of breath and wheezing. Cardiovascular: Negative for chest pain and palpitations. Gastrointestinal: Negative for abdominal pain, constipation, diarrhea, nausea and vomiting. Endocrine: Negative for polydipsia and polyuria. Genitourinary: Negative for difficulty urinating, dysuria, frequency, hematuria and urgency. Musculoskeletal: Negative for arthralgias and myalgias. Skin: Negative for color change, pallor and rash. Neurological: Negative for dizziness, weakness, light-headedness, numbness and headaches. Hematological: Negative for adenopathy. Does not bruise/bleed easily. All other systems reviewed and are negative. Physical Exam  Physical Exam  Vitals and nursing note reviewed. Constitutional:       Appearance: She is well-developed. HENT:      Head: Normocephalic and atraumatic.       Right Ear: External ear normal.      Left Ear: External ear normal.      Nose: Nose normal.   Eyes:      Conjunctiva/sclera: Conjunctivae normal.      Pupils: Pupils are equal, round, and reactive to light. Cardiovascular:      Rate and Rhythm: Normal rate and regular rhythm. Heart sounds: Normal heart sounds. Pulmonary:      Effort: Tachypnea and prolonged expiration present. Breath sounds: Decreased air movement present. Examination of the right-lower field reveals decreased breath sounds. Examination of the left-lower field reveals decreased breath sounds. Decreased breath sounds and wheezing present. No rales. Chest:      Chest wall: No tenderness. Abdominal:      General: Bowel sounds are normal. There is no distension. Palpations: Abdomen is soft. Tenderness: There is no abdominal tenderness. There is no guarding. Musculoskeletal:         General: Normal range of motion. Cervical back: Normal range of motion and neck supple. Skin:     General: Skin is warm and dry. Neurological:      Mental Status: She is alert and oriented to person, place, and time. Cranial Nerves: No cranial nerve deficit. Sensory: No sensory deficit.          Vital Signs  ED Triage Vitals [07/03/23 2034]   Temperature Pulse Respirations Blood Pressure SpO2   97.8 °F (36.6 °C) 104 22 151/87 91 %      Temp Source Heart Rate Source Patient Position - Orthostatic VS BP Location FiO2 (%)   Oral Monitor Lying Left arm --      Pain Score       9           Vitals:    07/03/23 2034   BP: 151/87   Pulse: 104   Patient Position - Orthostatic VS: Lying         Visual Acuity      ED Medications  Medications   azithromycin (ZITHROMAX) 500 mg in sodium chloride 0.9 % 250 mL IVPB (has no administration in time range)   methylPREDNISolone sodium succinate (Solu-MEDROL) injection 80 mg (80 mg Intravenous Given 7/3/23 2051)   ipratropium-albuterol (DUO-NEB) 0.5-2.5 mg/3 mL inhalation solution 3 mL (3 mL Nebulization Given 7/3/23 2051)       Diagnostic Studies  Results Reviewed     Procedure Component Value Units Date/Time    HS Troponin I 2hr [[id number]] Collected: 07/03/23 2236 Lab Status: In process Specimen: Blood from Arm, Left Updated: 07/03/23 2246    HS Troponin I 4hr [[id number]]     Lab Status: No result Specimen: Blood     B-Type Natriuretic Peptide(BNP) [[id number]]  (Abnormal) Collected: 07/03/23 2040    Lab Status: Final result Specimen: Blood from Arm, Left Updated: 07/03/23 2141      pg/mL     CBC and differential [[id number]]  (Abnormal) Collected: 07/03/23 2040    Lab Status: Final result Specimen: Blood from Arm, Left Updated: 07/03/23 2132     WBC 5.04 Thousand/uL      RBC 5.88 Million/uL      Hemoglobin 14.2 g/dL      Hematocrit 49.5 %      MCV 84 fL      MCH 24.1 pg      MCHC 28.7 g/dL      RDW 19.0 %      Platelets 478 Thousands/uL      nRBC 0 /100 WBCs      Neutrophils Relative 56 %      Immat GRANS % 0 %      Lymphocytes Relative 31 %      Monocytes Relative 9 %      Eosinophils Relative 3 %      Basophils Relative 1 %      Neutrophils Absolute 2.83 Thousands/µL      Immature Grans Absolute 0.02 Thousand/uL      Lymphocytes Absolute 1.55 Thousands/µL      Monocytes Absolute 0.46 Thousand/µL      Eosinophils Absolute 0.15 Thousand/µL      Basophils Absolute 0.03 Thousands/µL     Narrative: This is an appended report. These results have been appended to a previously verified report. FLU/RSV/COVID - if FLU/RSV clinically relevant [[id number]]  (Normal) Collected: 07/03/23 2029    Lab Status: Final result Specimen: Nares from Nose Updated: 07/03/23 2132     SARS-CoV-2 Negative     INFLUENZA A PCR Negative     INFLUENZA B PCR Negative     RSV PCR Negative    Narrative:      FOR PEDIATRIC PATIENTS - copy/paste COVID Guidelines URL to browser: [email or web address]    SARS-CoV-2 assay is a Nucleic Acid Amplification assay intended for the  qualitative detection of nucleic acid from SARS-CoV-2 in nasopharyngeal  swabs. Results are for the presumptive identification of SARS-CoV-2 RNA.     Positive results are indicative of infection with SARS-CoV-2, the virus  causing COVID-19, but do not rule out bacterial infection or co-infection  with other viruses. Laboratories within the [facility] and its  territories are required to report all positive results to the appropriate  public health authorities. Negative results do not preclude SARS-CoV-2  infection and should not be used as the sole basis for treatment or other  patient management decisions. Negative results must be combined with  clinical observations, patient history, and epidemiological information. This test has not been FDA cleared or approved. This test has been authorized by FDA under an Emergency Use Authorization  (EUA). This test is only authorized for the duration of time the  declaration that circumstances exist justifying the authorization of the  emergency use of an in vitro diagnostic tests for detection of SARS-CoV-2  virus and/or diagnosis of COVID-19 infection under section 564(b)(1) of  the Act, 21 U. S.C. 711SAE-1(P)(3), unless the authorization is terminated  or revoked sooner. The test has been validated but independent review by FDA  and CLIA is pending. Test performed using [employer] GeneXpert: This RT-PCR assay targets N2,  a region unique to SARS-CoV-2. A conserved region in the E-gene was chosen  for pan-Sarbecovirus detection which includes SARS-CoV-2. According to CMS-2020-01-R, this platform meets the definition of high-throughput technology.     Procalcitonin [[id number]]  (Normal) Collected: 07/03/23 2040    Lab Status: Final result Specimen: Blood from Arm, Left Updated: 07/03/23 2129     Procalcitonin <0.05 ng/ml     HS Troponin 0hr (reflex protocol) [[id number]]  (Normal) Collected: 07/03/23 2040    Lab Status: Final result Specimen: Blood from Arm, Left Updated: 07/03/23 2128     hs TnI 0hr 8 ng/L     Comprehensive metabolic panel [[id number]]  (Abnormal) Collected: 07/03/23 2040    Lab Status: Final result Specimen: Blood from Arm, Left Updated: 07/03/23 2124     Sodium 141 mmol/L      Potassium 4.3 mmol/L      Chloride 102 mmol/L      CO2 31 mmol/L      ANION GAP 8 mmol/L      BUN 16 mg/dL      Creatinine 0.75 mg/dL      Glucose 151 mg/dL      Calcium 8.9 mg/dL      AST 24 U/L      ALT 4 U/L      Alkaline Phosphatase 54 U/L      Total Protein 7.3 g/dL      Albumin 3.7 g/dL      Total Bilirubin 0.36 mg/dL      eGFR 94 ml/min/1.73sq m     Narrative:      [place] guidelines for Chronic Kidney Disease (CKD):   •  Stage 1 with normal or high GFR (GFR > 90 mL/min/1.73 square meters)  •  Stage 2 Mild CKD (GFR = 60-89 mL/min/1.73 square meters)  •  Stage 3A Moderate CKD (GFR = 45-59 mL/min/1.73 square meters)  •  Stage 3B Moderate CKD (GFR = 30-44 mL/min/1.73 square meters)  •  Stage 4 Severe CKD (GFR = 15-29 mL/min/1.73 square meters)  •  Stage 5 End Stage CKD (GFR <15 mL/min/1.73 square meters)  Note: GFR calculation is accurate only with a steady state creatinine    C-reactive protein [[id number]]  (Abnormal) Collected: 07/03/23 2040    Lab Status: Final result Specimen: Blood from Arm, Left Updated: 07/03/23 2124     CRP 7.8 mg/L     Magnesium [[id number]]  (Normal) Collected: 07/03/23 2040    Lab Status: Final result Specimen: Blood from Arm, Left Updated: 07/03/23 2124     Magnesium 2.3 mg/dL     Lactic acid, plasma (w/reflex if result > 2.0) [[id number]]  (Normal) Collected: 07/03/23 2040    Lab Status: Final result Specimen: Blood from Arm, Left Updated: 07/03/23 2116     LACTIC ACID 1.6 mmol/L     Narrative:      Result may be elevated if tourniquet was used during collection.     [place] [[id number]]  (Normal) Collected: 07/03/23 2040    Lab Status: Final result Specimen: Blood from Arm, Left Updated: 07/03/23 2113     Protime 12.1 seconds      INR 0.89    APTT [[id number]]  (Normal) Collected: 07/03/23 2040    Lab Status: Final result Specimen: Blood from Arm, Left Updated: 07/03/23 2113     PTT 28 seconds     Blood gas, venous [[id number]]  (Abnormal) Collected: 07/03/23 2040    Lab Status: Final result Specimen: Blood from Arm, Left Updated: 07/03/23 2053     pH, Victor M 7.316     pCO2, Victor M 65.8 mm Hg      pO2, Victor M 38.8 mm Hg      HCO3, Victor M 32.8 mmol/L      Base Excess, Victor M 4.4 mmol/L      O2 Content, Victor M 13.9 ml/dL      O2 HGB, VENOUS 66.8 %     Blood culture #2 [[id number]] Collected: 07/03/23 2040    Lab Status: In process Specimen: Blood from Arm, Left Updated: 07/03/23 2049    Blood culture #1 [[id number]] Collected: 07/03/23 2029    Lab Status: In process Specimen: Blood from Arm, Right Updated: 07/03/23 2049    UA w Reflex to Microscopic w Reflex to Culture [[id number]]     Lab Status: No result Specimen: Urine                  XR chest 1 view portable   ED Interpretation by Sandra Riley DO (07/03 2232)   Emphysema bibasilar atelectasis no focal infiltrate                 Procedures  ECG 12 Lead Documentation Only    Date/Time: 7/3/2023 8:54 PM    Performed by: Sandra Riley DO  Authorized by: Sandra Riley DO    ECG reviewed by me, the ED Provider: yes    Patient location:  ED  Previous ECG:     Comparison to cardiac monitor: Yes    Interpretation:     Interpretation: normal    Quality:     Tracing quality:  Limited by artifact  Rate:     ECG rate:  96    ECG rate assessment: normal    Rhythm:     Rhythm: sinus rhythm    QRS:     QRS axis:  Normal    QRS intervals:  Normal  Conduction:     Conduction: normal    ST segments:     ST segments:  Normal  T waves:     T waves: normal               ED Course  ED Course as of 07/03/23 2312 Mon Jul 03, 2023 2249 Spoke with [facility] advanced practitioner on-call reviewed case and findings in the emergency department and management thus far accepts for admission. SBIRT 20yo+    Flowsheet Row Most Recent Value   Initial Alcohol Screen: US AUDIT-C     1.  How often do you have a drink containing alcohol? 0 Filed at: 07/03/2023 2033   2. How many drinks containing alcohol do you have on a typical day you are drinking? 0 Filed at: 07/03/2023 2033   3a. Male UNDER 65: How often do you have five or more drinks on one occasion? 0 Filed at: 07/03/2023 2033   3b. FEMALE Any Age, or MALE 65+: How often do you have 4 or more drinks on one occassion? 0 Filed at: 07/03/2023 2033   Audit-C Score 0 Filed at: 07/03/2023 2033   KARLA: How many times in the past year have you. .. Used an illegal drug or used a prescription medication for non-medical reasons? Never Filed at: 07/03/2023 2033                    Medical Decision Making  Acute on chronic respiratory failure with hypercapnia [facility]): acute illness or injury  COPD exacerbation ([address]): acute illness or injury  Dyspnea: acute illness or injury  Amount and/or Complexity of Data Reviewed  Labs: ordered. Decision-making details documented in ED Course. Radiology: ordered and independent interpretation performed. Decision-making details documented in ED Course. ECG/medicine tests: ordered and independent interpretation performed. Decision-making details documented in ED Course. Risk  Prescription drug management. Decision regarding hospitalization.           Disposition  Final diagnoses:   COPD exacerbation ([address])   Acute on chronic respiratory failure with hypercapnia ([address])   Dyspnea     Time reflects when diagnosis was documented in both MDM as applicable and the Disposition within this note     Time User Action Codes Description Comment    7/3/2023 10:41 PM Jon Marshlal Add [J44.1] COPD exacerbation ([address])     7/3/2023 10:50 PM Jon Marshall Add [J96.22] Acute on chronic respiratory failure with hypercapnia ([address])     7/3/2023 10:51 PM Jon Marshall Add [R06.00] Dyspnea       ED Disposition     ED Disposition   Admit    Condition   Stable    Date/Time   Mon Jul 3, 2023 10:41 PM    Comment   Case was discussed with Tyler Finney and the patient's admission status was agreed to be Admission Status: inpatient status to the service of Dr. Anatoly Rivera. Follow-up Information    None         Patient's Medications   Discharge Prescriptions    No medications on file       No discharge procedures on file.     PDMP Review     None          ED Provider  Electronically Signed by           Jayden Merrill DO  07/03/23 8637

## 2023-07-04 NOTE — RESPIRATORY THERAPY NOTE
RT Protocol Note  João Matthews 50 y.o. female MRN: [id number]  Unit/Bed#: -01 Encounter: [id number]    Assessment    Principal Problem:    Acute exacerbation of chronic obstructive pulmonary disease (COPD) ([address] [facility])  Active Problems:    Raynaud's disease    Lupus ([facility])    Chronic respiratory failure (HCC)    Cellulitis of right index finger    Pre-diabetes    Thrombocytopenia (HCC)    Chest pain      Home Pulmonary Medications:  Albuterol MDI   Home Devices/Therapy: Home O2    Past Medical History:   Diagnosis Date   • Borderline diabetic    • COPD (chronic obstructive pulmonary disease) ([facility])    • Lupus ([facility])    • 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[facility])      Social History     Socioeconomic History   • Marital status: Single     Spouse name: None   • Number of children: None   • Years of education: None   • Highest education level: None   Occupational History   • None   Tobacco Use   • Smoking status: Every Day     Packs/day: 0.50     Types: Cigarettes   • Smokeless tobacco: Never   Vaping Use   • Vaping Use: Never used   Substance and Sexual Activity   • Alcohol use: Never   • Drug use: Never   • Sexual activity: None   Other Topics Concern   • None   Social History Narrative   • None     Social Determinants of Health     Financial Resource Strain: Not on file   Food Insecurity: Not on file   Transportation Needs: Not on file   Physical Activity: Not on file   Stress: Not on file   Social Connections: Not on file   Intimate Partner Violence: Not on file   Housing Stability: Not on file       Subjective         Objective    Physical Exam:   Assessment Type: Assess only  General Appearance: Awake, Alert  Respiratory Pattern: Normal, Dyspnea with exertion  Chest Assessment: Chest expansion symmetrical  Bilateral Breath Sounds: Diminished, Expiratory wheezes, Clear  Cough: Non-productive  O2 Device: 3L NC    Vitals:  Blood pressure 155/84, pulse 101, temperature 97.8 °F (36.6 °C), temperature source Oral, resp.  rate 21, height 5' 1" (1.549 m), weight 79.4 kg (175 lb), SpO2 93 %. Imaging and other studies: I have personally reviewed pertinent reports. O2 Device: 3L NC     Plan    Respiratory Plan: Mild Distress pathway        Resp Comments: Pt admitted for SOB/COPD . Caitie Ordonez- COPD (with PFT)/Lupus/Raynauds. Chronic home O2 of 3L. BS dim/mild wheeze with NP cough. Current smoker. Pt uses home MDI. VSS/SpO2 93%.

## 2023-07-04 NOTE — PLAN OF CARE
Problem: PAIN - ADULT  Goal: Verbalizes/displays adequate comfort level or baseline comfort level  Description: Interventions:  - Encourage patient to monitor pain and request assistance  - Assess pain using appropriate pain scale  - Administer analgesics based on type and severity of pain and evaluate response  - Implement non-pharmacological measures as appropriate and evaluate response  - Consider cultural and social influences on pain and pain management  - Notify physician/advanced practitioner if interventions unsuccessful or patient reports new pain  Outcome: Progressing     Problem: INFECTION - ADULT  Goal: Absence or prevention of progression during hospitalization  Description: INTERVENTIONS:  - Assess and monitor for signs and symptoms of infection  - Monitor lab/diagnostic results  - Monitor all insertion sites, i.e. indwelling lines, tubes, and drains  - Monitor endotracheal if appropriate and nasal secretions for changes in amount and color  - [place] appropriate cooling/warming therapies per order  - Administer medications as ordered  - Instruct and encourage patient and family to use good hand hygiene technique  - Identify and instruct in appropriate isolation precautions for identified infection/condition  Outcome: Progressing     Problem: SAFETY ADULT  Goal: Patient will remain free of falls  Description: INTERVENTIONS:  - Educate patient/family on patient safety including physical limitations  - Instruct patient to call for assistance with activity   - Consult OT/PT to assist with strengthening/mobility   - Keep Call bell within reach  - Keep bed low and locked with side rails adjusted as appropriate  - Keep care items and personal belongings within reach  - Initiate and maintain comfort rounds  - Make Fall Risk Sign visible to staff    - Apply yellow socks and bracelet for high fall risk patients  - Consider moving patient to room near nurses station  Outcome: Progressing  Goal: Maintain or return to baseline ADL function  Description: INTERVENTIONS:  -  Assess patient's ability to carry out ADLs; assess patient's baseline for ADL function and identify physical deficits which impact ability to perform ADLs (bathing, care of mouth/teeth, toileting, grooming, dressing, etc.)  - Assess/evaluate cause of self-care deficits   - Assess range of motion  - Assess patient's mobility; develop plan if impaired  - Assess patient's need for assistive devices and provide as appropriate  - Encourage maximum independence but intervene and supervise when necessary  - Involve family in performance of ADLs  - Assess for home care needs following discharge   - Consider OT consult to assist with ADL evaluation and planning for discharge  - Provide patient education as appropriate  Outcome: Progressing  Goal: Maintains/Returns to pre admission functional level  Description: INTERVENTIONS:  - Perform BMAT or MOVE assessment daily.   - Set and communicate daily mobility goal to care team and patient/family/caregiver.    - Collaborate with rehabilitation services on mobility goals if consulted    - Out of bed for toileting  - Record patient progress and toleration of activity level   Outcome: Progressing     Problem: DISCHARGE PLANNING  Goal: Discharge to home or other facility with appropriate resources  Description: INTERVENTIONS:  - Identify barriers to discharge w/patient and caregiver  - Arrange for needed discharge resources and transportation as appropriate  - Identify discharge learning needs (meds, wound care, etc.)  - Arrange for interpretive services to assist at discharge as needed  - Refer to Case Management Department for coordinating discharge planning if the patient needs post-hospital services based on physician/advanced practitioner order or complex needs related to functional status, cognitive ability, or social support system  Outcome: Progressing     Problem: Knowledge Deficit  Goal: Patient/family/caregiver demonstrates understanding of disease process, treatment plan, medications, and discharge instructions  Description: Complete learning assessment and assess knowledge base.   Interventions:  - Provide teaching at level of understanding  - Provide teaching via preferred learning methods  Outcome: Progressing

## 2023-07-04 NOTE — NURSING NOTE
Dr. Abebe Carrillo made aware that patient rates her right index finger pain 9/10. It was a 9/10 at 1426 when she was given the 5mg of oxycodone. Will see if she can get something for breakthrough pain.

## 2023-07-04 NOTE — RESPIRATORY THERAPY NOTE
Resp Care       07/04/23 0955   Inhalation Therapy Tx   $ Inhalation Therapy Performed Yes   $ Pulse Oximetry Spot Check Charge Completed   Pre-Treatment Pulse 83   Pre-Treatment Respirations 18   Duration 8   Breath Sounds Pre-Treatment Bilateral Diminished   Delivery Source Oxygen;UDN   Position High Vick's   Treatment Tolerance Tolerated well   Resp Comments pt became sob while walking to bathroom. pt bs diminished prn ordered and given.

## 2023-07-04 NOTE — ASSESSMENT & PLAN NOTE
· Complaining of chest tightness and shortness of breath  · Troponins negative thus far   · EKG nonischemic   · Recent echo June 2023 shows EF 55%  · Likely in the setting of acute COPD exacerbation   · CTAP no PE noted

## 2023-07-04 NOTE — ASSESSMENT & PLAN NOTE
· Recently moved from [facility] she is working on establishing care here   · Continue Plaquenil for now, monitor Platelets closely

## 2023-07-04 NOTE — PROGRESS NOTES
João Matthews is a 50 y.o. female who is currently ordered Vancomycin IV with management by the Pharmacy Consult service. Relevant clinical data and objective / subjective history reviewed. Vancomycin Assessment:  Indication and Goal AUC/Trough: Soft tissue (goal -600, trough >10)  Clinical Status:  Initial dosing  Micro:     Renal Function:  SCr: 0.75 mg/dL  CrCl: 87.5 mL/min  Renal replacement: Not on dialysis  Days of Therapy: 1  Current Dose: 1000mg q12h  Vancomycin Plan:  New Dosing:    Estimated AUC: 449 mcg*hr/mL  Estimated Trough: 13.7 mcg/mL  Next Level: 0600 on 7/5  Renal Function Monitoring: Daily BMP and [place] will continue to follow closely for s/sx of nephrotoxicity, infusion reactions and appropriateness of therapy. BMP and CBC will be ordered per protocol. We will continue to follow the patient’s culture results and clinical progress daily.     Edel Jean, Pharmacist
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
## 2023-07-04 NOTE — ASSESSMENT & PLAN NOTE
· Currently stable at baseline 3l NC  · In the setting of COPD   · Monitor respiratory status closely, continue treatment for acute COPD

## 2023-07-04 NOTE — ASSESSMENT & PLAN NOTE
· Current smoker , has had multiple fingers amputated in the past   · States hands fluctuate color, no new or worsening pain per patient  · Continue amlodipine , Sildenafil   · Smoking cessation encouraged   · Neurovascular checks

## 2023-07-04 NOTE — ASSESSMENT & PLAN NOTE
· Current smoker , has had multiple fingers amputated in the past   · States hands fluctuate color, no new or worsening pain per patient  · Continue amlodipine , Sildenafil   · Smoking cessation encouraged   · Neurovascular checks   · We will try to switch amlodipine to nifedipine which also helps with Raynaud's

## 2023-07-04 NOTE — H&P
[facility]  H&P  Name: Sammi Mariee 50 y.o. female I MRN: [id number]  Unit/Bed#: MS [facility] I Date of Admission: 7/3/2023   Date of Service: 7/4/2023 I Hospital Day: 1      Assessment/Plan   * Acute exacerbation of chronic obstructive pulmonary disease (COPD) ([address] [facility])  Assessment & Plan  · Presents from home with shortness of breath . Per EMS received multiple nebulizer treatments pre hospital. Chronic respiratory failure on 3l NC at baseline. · Currently stable on baseline O2 3L   · VBG pH 7.31, CO2 65.8 (appears similar to ABG in June 2023 likely near baseline)   · XR chest pending final read   · Appreciate pulmonary consult   · Continue IV Solu-medrol every 8 hours, Xopenex TID. · Continue Cefepime/Vancomycin for now (due to possible right finger cellulitis - see plan)   · Repeat VBG in AM   · Pulmonary toilet  · Smoking cessation encouraged     Cellulitis of right index finger  Assessment & Plan  · Complicated history of Raynauds , smoking, multiple finger amputations in the past. Reportedly had partial amputation of right 2nd finger in [place] around May 2023. Recently admitted at [facility] in June 2023, had evaluation of right 2nd finger discoloration , appears was evaluated by Rheum and Orthopedics . No surgical intervention was recommended . Patient states she had follow-up outpatient appointment with orthopedics but was unable to get to her appointment    · Right 2nd finger with black/brown discoloration at the tip . No drainage noted. Mild swelling and erythema on exam (erythema appears similar to other fingers due to Raynaud's). Complains of pain that is not new or worsening. ·  Afebrile, no leukocytosis.     · Check XR right hand to rule out gas or osteomyelitis   · Appreciate orthopedics consult    · Start cefepime and vancomyin for now   · Prn pain control  · Neurovascular checks     Chest pain  Assessment & Plan  · Complaining of chest tightness and shortness of breath  · Troponins negative thus far   · EKG nonischemic   · Recent echo June 2023 shows EF 55%  · Likely in the setting of acute COPD exacerbation   · Check d-dimer- consider CTA pe study if elevated     Thrombocytopenia (HCC)  Assessment & Plan  · Plt 123 on admission  · Hold Plaquenil at this time   · Trend CBC     Pre-diabetes  Assessment & Plan  · Reports now off Metformin   · Place on SSI while inpatient , monitor closely while on IV steroids     Chronic respiratory failure (HCC)  Assessment & Plan  · Currently stable at baseline 3l NC  · In the setting of COPD   · Monitor respiratory status closely, continue treatment for acute COPD         Lupus ([address])  Assessment & Plan  · Recently moved from [facility] she is working on establishing care here   · Continue Plaquenil for now, monitor Platelets closely     Raynaud's disease  Assessment & Plan  · Current smoker , has had multiple fingers amputated in the past   · States hands fluctuate color, no new or worsening pain per patient  · Continue amlodipine , Sildenafil   · Smoking cessation encouraged   · Neurovascular checks          VTE Pharmacologic Prophylaxis:   Moderate Risk (Score 3-4) - Pharmacological DVT Prophylaxis Ordered: heparin. Code Status: Level 3 - DNAR and DNI   Discussion with family: Updated  (son) at bedside. Anticipated Length of Stay: Patient will be admitted on an inpatient basis with an anticipated length of stay of greater than 2 midnights secondary to copd exac , poss right finger cellulitis - iv steroids, nebs, iv abx .     Total Time Spent on Date of Encounter in care of patient: 75 minutes This time was spent on one or more of the following: performing physical exam; counseling and coordination of care; obtaining or reviewing history; documenting in the medical record; reviewing/ordering tests, medications or procedures; communicating with other healthcare professionals and discussing with patient's family/caregivers. Chief Complaint: sob     History of Present Illness:  Gini Estrada is a 50 y.o. female with a PMH of COPD, lupus, Raynaud's, prediabetes, chronic respiratory failure on 3 L nasal cannula who presents from home with reports of shortness of breath and chest tightness. Non productive cough. Reports increased use of rescue inhaler over the several days. Wears 3 L nasal cannula  at baseline. Reports pain in right second finger that is not new or worsened. Reports recently moved from [place] is working on establishing care. Had recent partial right second finger amputation while in [place], then was admitted at [facility] in June 2023 for further workup . Reports she had an outpatient appointment with orthopedics but was unable to get a ride to the appointment. Current smoker. Denies fevers , chills, GI complaints. Review of Systems:  Review of Systems   Constitutional: Positive for activity change. Negative for chills and fever. Respiratory: Positive for cough, chest tightness, shortness of breath and wheezing. Cardiovascular: Negative for chest pain, palpitations and leg swelling. Gastrointestinal: Negative for abdominal pain, diarrhea, nausea and vomiting. Genitourinary: Negative for difficulty urinating and dysuria. Musculoskeletal: Positive for arthralgias. Skin: Negative for color change. Neurological: Negative for dizziness, weakness, light-headedness, numbness and headaches. 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  Authorizing Provider   albuterol (PROVENTIL HFA,VENTOLIN HFA) 90 mcg/act inhaler Inhale 2 puffs every 6 (six) hours as needed for wheezing   Yes Historical Provider, MD   amLODIPine (NORVASC) 5 mg tablet Take 5 mg by mouth daily   Yes Historical Provider, MD   Hydroxychloroquine Sulfate (PLAQUENIL PO) Take 100 mg by mouth 2 (two) times a day   Yes Historical Provider, MD   sildenafil (REVATIO) 20 mg tablet Take 20 mg by mouth 3 (three) times a day    Historical Provider, MD     I have reviewed home medications with patient personally. Allergies: Allergies   Allergen Reactions   • Aspirin Hives       Social History:  Marital Status: Single     Patient Pre-hospital Living Situation: Home  Patient Pre-hospital Level of Mobility: walks  Patient Pre-hospital Diet Restrictions: none  Substance Use History:   Social History     Substance and Sexual Activity   Alcohol Use Never     Social History     Tobacco Use   Smoking Status Every Day   • Packs/day: 0.50   • Types: Cigarettes   Smokeless Tobacco Never     Social History     Substance and Sexual Activity   Drug Use Never       Family History:  History reviewed. No pertinent family history. Physical Exam:     Vitals:   Blood Pressure: 155/84 (07/03/23 2341)  Pulse: 95 (07/03/23 2341)  Temperature: 97.8 °F (36.6 °C) (07/03/23 2034)  Temp Source: Oral (07/03/23 2034)  Respirations: 22 (07/03/23 2034)  Height: 5' 1" (154.9 cm) (07/03/23 2034)  Weight - Scale: 79.4 kg (175 lb) (07/03/23 2034)  SpO2: 92 % (07/03/23 2341)    Physical Exam  Vitals and nursing note reviewed. Constitutional:       General: She is not in acute distress. Appearance: She is ill-appearing. She is not toxic-appearing. Comments: Chronically ill appearing    Cardiovascular:      Rate and Rhythm: Normal rate and regular rhythm. Pulses: Normal pulses. Heart sounds: Normal heart sounds. Pulmonary:      Effort: Pulmonary effort is normal. No respiratory distress. Breath sounds: No wheezing, rhonchi or rales.       Comments: Diminished throughout   Abdominal: General: Bowel sounds are normal. There is no distension. Palpations: Abdomen is soft. Tenderness: There is no abdominal tenderness. There is no guarding or rebound. Musculoskeletal:         General: Normal range of motion. Right lower leg: No edema. Left lower leg: No edema. Comments: Various finger amputations bilaterally    Skin:     General: Skin is warm and dry. Findings: Erythema present. Comments: Right second finger brown/black discoloration at the tip. Mild swelling . No drainage. Fingers are red in color similar on all digits. Warm to touch   Neurological:      General: No focal deficit present. Mental Status: She is alert and oriented to person, place, and time.          Additional Data:     Lab Results:  Results from last 7 days   Lab Units 07/03/23 2040   WBC Thousand/uL 5.04   HEMOGLOBIN g/dL 14.2   HEMATOCRIT % 49.5*   PLATELETS Thousands/uL 123*   NEUTROS PCT % 56   LYMPHS PCT % 31   MONOS PCT % 9   EOS PCT % 3     Results from last 7 days   Lab Units 07/03/23 2040   SODIUM mmol/L 141   POTASSIUM mmol/L 4.3   CHLORIDE mmol/L 102   CO2 mmol/L 31   BUN mg/dL 16   CREATININE mg/dL 0.75   ANION GAP mmol/L 8   CALCIUM mg/dL 8.9   ALBUMIN g/dL 3.7   TOTAL BILIRUBIN mg/dL 0.36   ALK PHOS U/L 54   ALT U/L 4*   AST U/L 24   GLUCOSE RANDOM mg/dL 151*     Results from last 7 days   Lab Units 07/03/23  2040   INR  0.89             Results from last 7 days   Lab Units 07/03/23 2040   LACTIC ACID mmol/L 1.6   PROCALCITONIN ng/ml <0.05       Lines/Drains:  Invasive Devices     Peripheral Intravenous Line  Duration           Peripheral IV 07/03/23 Left Antecubital <1 day                    Imaging: Personally reviewed the following imaging: chest xray  XR chest 1 view portable   ED Interpretation by Darrell Delgado DO (07/03 2232)   Emphysema bibasilar atelectasis no focal infiltrate      XR hand 2 vw right    (Results Pending)   CTA chest pe study    (Results Pending) EKG and Other Studies Reviewed on Admission:   · EKG: NSR. HR 96.    ** Please Note: This note has been constructed using a voice recognition system.  **

## 2023-07-05 ENCOUNTER — APPOINTMENT (INPATIENT)
Dept: CT IMAGING | Facility: HOSPITAL | Age: 48
DRG: 383 | End: 2023-07-05
Payer: COMMERCIAL

## 2023-07-05 LAB
ALBUMIN SERPL BCP-MCNC: 3.7 G/DL (ref 3.5–5)
ALP SERPL-CCNC: 49 U/L (ref 34–104)
ALT SERPL W P-5'-P-CCNC: 10 U/L (ref 7–52)
ANION GAP SERPL CALCULATED.3IONS-SCNC: 6 MMOL/L
AST SERPL W P-5'-P-CCNC: 10 U/L (ref 13–39)
BASOPHILS # BLD AUTO: 0.01 THOUSANDS/ÂΜL (ref 0–0.1)
BASOPHILS NFR BLD AUTO: 0 % (ref 0–1)
BILIRUB SERPL-MCNC: 0.24 MG/DL (ref 0.2–1)
BUN SERPL-MCNC: 24 MG/DL (ref 5–25)
CALCIUM SERPL-MCNC: 9.4 MG/DL (ref 8.4–10.2)
CHLORIDE SERPL-SCNC: 103 MMOL/L (ref 96–108)
CO2 SERPL-SCNC: 29 MMOL/L (ref 21–32)
CREAT SERPL-MCNC: 0.75 MG/DL (ref 0.6–1.3)
EOSINOPHIL # BLD AUTO: 0 THOUSAND/ÂΜL (ref 0–0.61)
EOSINOPHIL NFR BLD AUTO: 0 % (ref 0–6)
ERYTHROCYTE [DISTWIDTH] IN BLOOD BY AUTOMATED COUNT: 18.2 % (ref 11.6–15.1)
GFR SERPL CREATININE-BSD FRML MDRD: 94 ML/MIN/1.73SQ M
GLUCOSE SERPL-MCNC: 195 MG/DL (ref 65–140)
GLUCOSE SERPL-MCNC: 195 MG/DL (ref 65–140)
GLUCOSE SERPL-MCNC: 209 MG/DL (ref 65–140)
GLUCOSE SERPL-MCNC: 283 MG/DL (ref 65–140)
GLUCOSE SERPL-MCNC: 298 MG/DL (ref 65–140)
HCT VFR BLD AUTO: 44.8 % (ref 34.8–46.1)
HGB BLD-MCNC: 13.1 G/DL (ref 11.5–15.4)
IMM GRANULOCYTES # BLD AUTO: 0.06 THOUSAND/UL (ref 0–0.2)
IMM GRANULOCYTES NFR BLD AUTO: 1 % (ref 0–2)
LYMPHOCYTES # BLD AUTO: 0.49 THOUSANDS/ÂΜL (ref 0.6–4.47)
LYMPHOCYTES NFR BLD AUTO: 5 % (ref 14–44)
MCH RBC QN AUTO: 24.7 PG (ref 26.8–34.3)
MCHC RBC AUTO-ENTMCNC: 29.2 G/DL (ref 31.4–37.4)
MCV RBC AUTO: 84 FL (ref 82–98)
MONOCYTES # BLD AUTO: 0.27 THOUSAND/ÂΜL (ref 0.17–1.22)
MONOCYTES NFR BLD AUTO: 3 % (ref 4–12)
MRSA NOSE QL CULT: NORMAL
NEUTROPHILS # BLD AUTO: 10.01 THOUSANDS/ÂΜL (ref 1.85–7.62)
NEUTS SEG NFR BLD AUTO: 91 % (ref 43–75)
NRBC BLD AUTO-RTO: 0 /100 WBCS
PLATELET # BLD AUTO: 251 THOUSANDS/UL (ref 149–390)
PMV BLD AUTO: 10.4 FL (ref 8.9–12.7)
POTASSIUM SERPL-SCNC: 4.6 MMOL/L (ref 3.5–5.3)
PROT SERPL-MCNC: 7 G/DL (ref 6.4–8.4)
RBC # BLD AUTO: 5.31 MILLION/UL (ref 3.81–5.12)
SODIUM SERPL-SCNC: 138 MMOL/L (ref 135–147)
VANCOMYCIN SERPL-MCNC: 19.7 UG/ML (ref 10–20)
WBC # BLD AUTO: 10.84 THOUSAND/UL (ref 4.31–10.16)

## 2023-07-05 PROCEDURE — 94640 AIRWAY INHALATION TREATMENT: CPT

## 2023-07-05 PROCEDURE — 71275 CT ANGIOGRAPHY CHEST: CPT

## 2023-07-05 PROCEDURE — 94760 N-INVAS EAR/PLS OXIMETRY 1: CPT

## 2023-07-05 PROCEDURE — 94660 CPAP INITIATION&MGMT: CPT

## 2023-07-05 PROCEDURE — 99233 SBSQ HOSP IP/OBS HIGH 50: CPT | Performed by: FAMILY MEDICINE

## 2023-07-05 PROCEDURE — 85025 COMPLETE CBC W/AUTO DIFF WBC: CPT | Performed by: FAMILY MEDICINE

## 2023-07-05 PROCEDURE — G1004 CDSM NDSC: HCPCS

## 2023-07-05 PROCEDURE — 82948 REAGENT STRIP/BLOOD GLUCOSE: CPT

## 2023-07-05 PROCEDURE — 80053 COMPREHEN METABOLIC PANEL: CPT | Performed by: FAMILY MEDICINE

## 2023-07-05 PROCEDURE — 80202 ASSAY OF VANCOMYCIN: CPT

## 2023-07-05 RX ORDER — GABAPENTIN 100 MG/1
100 CAPSULE ORAL 3 TIMES DAILY
Status: DISCONTINUED | OUTPATIENT
Start: 2023-07-05 | End: 2023-07-08 | Stop reason: HOSPADM

## 2023-07-05 RX ORDER — VANCOMYCIN HYDROCHLORIDE 1 G/200ML
1000 INJECTION, SOLUTION INTRAVENOUS EVERY 12 HOURS
Status: DISCONTINUED | OUTPATIENT
Start: 2023-07-05 | End: 2023-07-07

## 2023-07-05 RX ADMIN — INSULIN LISPRO 2 UNITS: 100 INJECTION, SOLUTION INTRAVENOUS; SUBCUTANEOUS at 16:32

## 2023-07-05 RX ADMIN — AMLODIPINE BESYLATE 5 MG: 5 TABLET ORAL at 09:06

## 2023-07-05 RX ADMIN — METHYLPREDNISOLONE SODIUM SUCCINATE 40 MG: 40 INJECTION, POWDER, FOR SOLUTION INTRAMUSCULAR; INTRAVENOUS at 21:30

## 2023-07-05 RX ADMIN — LEVALBUTEROL HYDROCHLORIDE 1.25 MG: 1.25 SOLUTION RESPIRATORY (INHALATION) at 20:46

## 2023-07-05 RX ADMIN — METHYLPREDNISOLONE SODIUM SUCCINATE 40 MG: 40 INJECTION, POWDER, FOR SOLUTION INTRAMUSCULAR; INTRAVENOUS at 05:25

## 2023-07-05 RX ADMIN — GUAIFENESIN 600 MG: 600 TABLET ORAL at 09:06

## 2023-07-05 RX ADMIN — INSULIN LISPRO 5 UNITS: 100 INJECTION, SOLUTION INTRAVENOUS; SUBCUTANEOUS at 16:32

## 2023-07-05 RX ADMIN — LIDOCAINE 1 PATCH: 50 PATCH CUTANEOUS at 09:07

## 2023-07-05 RX ADMIN — INSULIN LISPRO 5 UNITS: 100 INJECTION, SOLUTION INTRAVENOUS; SUBCUTANEOUS at 08:01

## 2023-07-05 RX ADMIN — INSULIN LISPRO 5 UNITS: 100 INJECTION, SOLUTION INTRAVENOUS; SUBCUTANEOUS at 11:27

## 2023-07-05 RX ADMIN — OXYCODONE HYDROCHLORIDE 5 MG: 5 TABLET ORAL at 18:02

## 2023-07-05 RX ADMIN — CEFEPIME HYDROCHLORIDE 2000 MG: 2 INJECTION, SOLUTION INTRAVENOUS at 01:00

## 2023-07-05 RX ADMIN — INSULIN LISPRO 2 UNITS: 100 INJECTION, SOLUTION INTRAVENOUS; SUBCUTANEOUS at 08:00

## 2023-07-05 RX ADMIN — INSULIN LISPRO 4 UNITS: 100 INJECTION, SOLUTION INTRAVENOUS; SUBCUTANEOUS at 11:27

## 2023-07-05 RX ADMIN — LEVALBUTEROL HYDROCHLORIDE 1.25 MG: 1.25 SOLUTION RESPIRATORY (INHALATION) at 07:57

## 2023-07-05 RX ADMIN — INSULIN LISPRO 4 UNITS: 100 INJECTION, SOLUTION INTRAVENOUS; SUBCUTANEOUS at 21:39

## 2023-07-05 RX ADMIN — GUAIFENESIN 600 MG: 600 TABLET ORAL at 16:33

## 2023-07-05 RX ADMIN — OFLOXACIN 1 DROP: 3 SOLUTION OPHTHALMIC at 09:08

## 2023-07-05 RX ADMIN — SILDENAFIL 20 MG: 20 TABLET ORAL at 21:26

## 2023-07-05 RX ADMIN — CEFEPIME HYDROCHLORIDE 2000 MG: 2 INJECTION, SOLUTION INTRAVENOUS at 12:36

## 2023-07-05 RX ADMIN — LEVALBUTEROL HYDROCHLORIDE 1.25 MG: 1.25 SOLUTION RESPIRATORY (INHALATION) at 13:36

## 2023-07-05 RX ADMIN — HEPARIN SODIUM 5000 UNITS: 5000 INJECTION INTRAVENOUS; SUBCUTANEOUS at 05:25

## 2023-07-05 RX ADMIN — SILDENAFIL 20 MG: 20 TABLET ORAL at 16:33

## 2023-07-05 RX ADMIN — HYDROXYCHLOROQUINE SULFATE 100 MG: 200 TABLET, FILM COATED ORAL at 16:33

## 2023-07-05 RX ADMIN — OFLOXACIN 1 DROP: 3 SOLUTION OPHTHALMIC at 11:26

## 2023-07-05 RX ADMIN — VANCOMYCIN HYDROCHLORIDE 1000 MG: 1 INJECTION, SOLUTION INTRAVENOUS at 22:51

## 2023-07-05 RX ADMIN — NICOTINE 1 PATCH: 14 PATCH, EXTENDED RELEASE TRANSDERMAL at 09:08

## 2023-07-05 RX ADMIN — GABAPENTIN 100 MG: 100 CAPSULE ORAL at 16:33

## 2023-07-05 RX ADMIN — OXYCODONE HYDROCHLORIDE 5 MG: 5 TABLET ORAL at 23:17

## 2023-07-05 RX ADMIN — METHYLPREDNISOLONE SODIUM SUCCINATE 40 MG: 40 INJECTION, POWDER, FOR SOLUTION INTRAMUSCULAR; INTRAVENOUS at 12:30

## 2023-07-05 RX ADMIN — SILDENAFIL 20 MG: 20 TABLET ORAL at 09:06

## 2023-07-05 RX ADMIN — HEPARIN SODIUM 5000 UNITS: 5000 INJECTION INTRAVENOUS; SUBCUTANEOUS at 12:30

## 2023-07-05 RX ADMIN — GABAPENTIN 100 MG: 100 CAPSULE ORAL at 21:26

## 2023-07-05 RX ADMIN — HYDROXYCHLOROQUINE SULFATE 100 MG: 200 TABLET, FILM COATED ORAL at 09:17

## 2023-07-05 RX ADMIN — OFLOXACIN 1 DROP: 3 SOLUTION OPHTHALMIC at 16:32

## 2023-07-05 RX ADMIN — OXYCODONE HYDROCHLORIDE 5 MG: 5 TABLET ORAL at 09:09

## 2023-07-05 RX ADMIN — OFLOXACIN 1 DROP: 3 SOLUTION OPHTHALMIC at 21:39

## 2023-07-05 RX ADMIN — HEPARIN SODIUM 5000 UNITS: 5000 INJECTION INTRAVENOUS; SUBCUTANEOUS at 21:28

## 2023-07-05 RX ADMIN — VANCOMYCIN HYDROCHLORIDE 1000 MG: 1 INJECTION, SOLUTION INTRAVENOUS at 11:20

## 2023-07-05 NOTE — ASSESSMENT & PLAN NOTE
· Presents from home with shortness of breath . Per EMS received multiple nebulizer treatments pre hospital. Chronic respiratory failure on 3l NC at baseline. · Currently stable on baseline O2 3L   · VBG pH 7.31, CO2 65.8 (appears similar to ABG in June 2023 likely near baseline)   · Repeat VBG shows respiratory acidosis compensating. Continue high-dose of steroid, continue inhaled corticosteroid and nebulizer treatment  Pending formal pulmonary consult  Use BiPAP during sleeping hours as per pulmonary recommendation over the phone.

## 2023-07-05 NOTE — RESPIRATORY THERAPY NOTE
RT Ventilator Management Note  Asa Colla 50 y.o. female MRN: [id number]  Unit/Bed#: -01 Encounter: [id number]      Daily Screen    No data found in the last 10 encounters. Physical Exam:          Pt placed onto CPAP + 5 @ 30% for HS as per MD order. Pt has no Hx of using NIV/no prior sleep study - pt agreed to a trial stating "anything to help me". Pt with adequate mechanics on CPAP. Pt wore from 2245 -0230 hrs.

## 2023-07-05 NOTE — ASSESSMENT & PLAN NOTE
· Recently moved from [facility] she is working on establishing care here   · Continue Plaquenil for now, monitor Platelets closely

## 2023-07-05 NOTE — UTILIZATION REVIEW
Initial Clinical Review    Admission: Date/Time/Statement:   Admission Orders (From admission, onward)     Ordered        07/03/23 2250  INPATIENT ADMISSION  Once                      Orders Placed This Encounter   Procedures   • INPATIENT ADMISSION     Standing Status:   Standing     Number of Occurrences:   1     Order Specific Question:   Level of Care     Answer:   Med Surg [16]     Order Specific Question:   Estimated length of stay     Answer:   More than 2 Midnights     Order Specific Question:   Certification     Answer:   I certify that inpatient services are medically necessary for this patient for a duration of greater than two midnights. See H&P and MD Progress Notes for additional information about the patient's course of treatment. ED Arrival Information     Expected   7/3/2023     Arrival   7/3/2023 20:28    Acuity   Urgent            Means of arrival   Ambulance    Escorted by   [address] [place] [address] Ambulance    Service   Hospitalist    Admission type   Emergency            Arrival complaint   sob           Chief Complaint   Patient presents with   • Shortness of Breath     Pt reports not feeling well all day, and the shortness of breath starting this morning. Pt with hx of copd, and uses 3L at home chronically, was still feeling short of breath. Pt reports using inhaler at home with no relief. Pt given 3 duo nebs and 40mg of Solu-medrol by ems PTA       Initial Presentation: 50 y.o. female to ED via EMS from home  Present to ED with reports of shortness of breath and chest tightness. Non productive cough. Reports increased use of rescue inhaler over the several days. Wears 3 L nasal cannula  at baseline.    PMHX COPD, lupus, Raynaud's, prediabetes, chronic respiratory failure on 3 L nasal cannula   Admitted to [facility] with DX: Acute exacerbation of chronic obstructive pulmonary disease (COPD)   on exam: tachy; tachypnea; lungs diminished throughout  PLAN: cont iv abx; cont solumedrol iv; cont Nebs; consult pulmonary; neurovasc checks; pain control; Accuchecks with [facility]      Date: 7/4/23     Day 2  having some labored breathing after recent walk to the restroom. Continues with tachycardia; lungs with rales  - respiratory distress present; Repeat VBG shows respiratory acidosis compensating.   Plan: cont iv abx; cont solumedrol iv; cont Nebs; consult pulmonary; neurovasc checks; pain control; Accuchecks with [facility]      ED Triage Vitals [07/03/23 2034]   Temperature Pulse Respirations Blood Pressure SpO2   97.8 °F (36.6 °C) 104 22 151/87 91 %      Temp Source Heart Rate Source Patient Position - Orthostatic VS BP Location FiO2 (%)   Oral Monitor Lying Left arm --      Pain Score       9          Wt Readings from Last 1 Encounters:   07/04/23 77.7 kg (171 lb 3.2 oz)     Additional Vital Signs:   Date/Time Temp Pulse Resp BP MAP (mmHg) SpO2 Calculated FIO2 (%) - Nasal Cannula Nasal Cannula O2 Flow Rate (L/min) O2 Device O2 Interface Device Patient Position - Orthostatic VS   07/05/23 09:03:40 98.2 °F (36.8 °C) 95 -- 117/58 78 93 % -- -- -- -- --   07/05/23 0757 -- -- -- -- -- 93 % -- -- -- -- --   07/05/23 07:10:48 97.9 °F (36.6 °C) 71 18 129/75 93 94 % -- -- -- -- --   07/04/23 23:20:56 97.9 °F (36.6 °C) 75 -- 123/72 89 86 % Abnormal  -- -- -- -- --   07/04/23 2300 -- -- -- -- -- 93 % 32 3 L/min None (Room air) -- --   07/04/23 2245 -- -- -- -- -- -- -- -- -- Face mask --   07/04/23 2032 -- -- -- -- -- 96 % -- -- -- -- --   07/04/23 1741 -- 93 -- -- -- -- -- -- -- -- --   07/04/23 1700 -- 122 Abnormal  -- -- -- -- -- -- -- -- --   07/04/23 1526 -- 98 -- -- -- -- -- -- -- -- --   07/04/23 1500 -- 103 -- -- -- -- -- -- -- -- --   07/04/23 14:24:26 98.8 °F (37.1 °C) 104 18 120/70 87 93 % -- -- -- -- --   07/04/23 0740 -- -- -- -- -- 96 % 32 3 L/min Nasal cannula -- --   07/04/23 07:12:58 98.2 °F (36.8 °C) 89 18 122/71 88 94 % -- -- -- -- --   07/04/23 0035 -- 101 21 -- -- 93 % -- -- -- -- --   07/03/23 23:41:37 -- 95 -- 155/84 108 92 % -- -- -- -- --   07/03/23 2340 -- -- -- -- -- 92 % 32 3 L/min Nasal cannula -- --   07/03/23 2034 97.8 °F (36.6 °C) 104 22 151/87 -- 91 % 32 3 L/min Nasal cannula -- Lying         EKG: Previous ECG:     Comparison to cardiac monitor: Yes     Interpretation:     Interpretation: normal     Quality:     Tracing quality:  Limited by artifact   Rate:     ECG rate:  96     ECG rate assessment: normal     Rhythm:     Rhythm: sinus rhythm     QRS:     QRS axis:  Normal     QRS intervals:  Normal   Conduction:     Conduction: normal     ST segments:     ST segments:  Normal   T waves:     T waves: normal      Pertinent Labs/Diagnostic Test Results:   CTA chest pe study   Final Result by Raina Cyr MD (07/04 9356)      No intraluminal filling defect to suggest an acute pulmonary embolus. Mild bilateral atelectatic and emphysematous lung changes with no focal infiltrate or pleural effusion. Workstation performed: [id number]         XR hand 2 vw right   Final Result by Rc Pearson MD (07/04 0153)      Mildly comminuted fracture at the distal tuft of the right second digit. Erosive/resorptive changes of the distal tuft of the right fourth digit which may reflect phalangeal osteolysis given history of Raynaud's disease. Correlate with clinical exam.            Workstation performed: [id number]         XR chest 1 view portable   ED Interpretation by Daria Galaviz DO (07/03 2232)   Emphysema bibasilar atelectasis no focal infiltrate      Final Result by Clover Porter MD (07/04 1125)      No acute cardiopulmonary disease.                   Workstation performed: [id number]           Results from last 7 days   Lab Units 07/03/23 2029   SARS-COV-2  Negative     Results from last 7 days   Lab Units 07/05/23  0549 07/04/23  0524 07/03/23 2040   WBC Thousand/uL 10.84* 3.97* 5.04   HEMOGLOBIN g/dL 13.1 12.9 14.2   HEMATOCRIT % 44.8 44.5 49.5*   PLATELETS Thousands/uL 251 199 123*   NEUTROS ABS Thousands/µL 10.01*  --  2.83         Results from last 7 days   Lab Units 07/05/23  0549 07/04/23  0524 07/03/23 2040   SODIUM mmol/L 138 137 141   POTASSIUM mmol/L 4.6 3.9 4.3   CHLORIDE mmol/L 103 104 102   CO2 mmol/L 29 27 31   ANION GAP mmol/L 6 6 8   BUN mg/dL 24 15 16   CREATININE mg/dL 0.75 0.63 0.75   EGFR ml/min/1.73sq m 94 106 94   CALCIUM mg/dL 9.4 8.9 8.9   MAGNESIUM mg/dL  --   --  2.3     Results from last 7 days   Lab Units 07/05/23  0549 07/04/23  0524 07/03/23 2040   AST U/L 10* 8* 24   ALT U/L 10 4* 4*   ALK PHOS U/L 49 46 54   TOTAL PROTEIN g/dL 7.0 6.5 7.3   ALBUMIN g/dL 3.7 3.5 3.7   TOTAL BILIRUBIN mg/dL 0.24 0.25 0.36     Results from last 7 days   Lab Units 07/05/23  0708 07/04/23  2139 07/04/23  1539 07/04/23  1048 07/04/23  0711 07/04/23  0019   POC GLUCOSE mg/dl 195* 151* 211* 302* 204* 257*     Results from last 7 days   Lab Units 07/05/23  0549 07/04/23  0524 07/03/23  2040   GLUCOSE RANDOM mg/dL 195* 213* 151*       Results from last 7 days   Lab Units 07/04/23  0524 07/03/23 2040   PH SHI  7.285* 7.316   PCO2 SHI mm Hg 61.9* 65.8*   PO2 SHI mm Hg 69.4* 38.8   HCO3 SHI mmol/L 28.8 32.8*   BASE EXC SHI mmol/L 0.6 4.4   O2 CONTENT SHI ml/dL 17.7 13.9   O2 HGB, VENOUS % 89.4* 66.8       Results from last 7 days   Lab Units 07/04/23  0052 07/03/23 2236 07/03/23 2040   HS TNI 0HR ng/L  --   --  8   HS TNI 2HR ng/L  --  9  --    HSTNI D2 ng/L  --  1  --    HS TNI 4HR ng/L 8  --   --    HSTNI D4 ng/L 0  --   --      Results from last 7 days   Lab Units 07/03/23 2040   D-DIMER QUANTITATIVE ug/ml FEU 1.40*     Results from last 7 days   Lab Units 07/03/23 2040   PROTIME seconds 12.1   INR  0.89   PTT seconds 28         Results from last 7 days   Lab Units 07/04/23  0524 07/03/23 2040   PROCALCITONIN ng/ml <0.05 <0.05     Results from last 7 days   Lab Units 07/03/23 2040   LACTIC ACID mmol/L 1.6       Results from last 7 days   Lab Units 07/03/23 2040   BNP pg/mL 133*       Results from last 7 days   Lab Units 07/03/23 2040   CRP mg/L 7.8*       Results from last 7 days   Lab Units 07/04/23  0020   CLARITY UA  Clear   COLOR UA  Yellow   SPEC GRAV UA  >=1.030   PH UA  5.5   GLUCOSE UA mg/dl 250 (1/4%)*   KETONES UA mg/dl Negative   BLOOD UA  Negative   PROTEIN UA mg/dl Trace*   NITRITE UA  Positive*   BILIRUBIN UA  Negative   UROBILINOGEN UA E.U./dl 0.2   LEUKOCYTES UA  Negative   WBC UA /hpf 20-30*   RBC UA /hpf 0-1   BACTERIA UA /hpf Innumerable*   EPITHELIAL CELLS WET PREP /hpf Occasional   MUCUS THREADS  Occasional*     Results from last 7 days   Lab Units 07/03/23 2029   INFLUENZA A PCR  Negative   INFLUENZA B PCR  Negative   RSV PCR  Negative       Results from last 7 days   Lab Units 07/04/23  0944 07/03/23 2040 07/03/23 2029   BLOOD CULTURE   --  Received in Microbiology Lab. Culture in Progress. Received in Microbiology Lab. Culture in Progress. GRAM STAIN RESULT  Rare Epithelial cells per low power field  1+ Polys  No bacteria seen  --   --        Results from last 7 days   Lab Units 07/05/23  0549   VANCOMYCIN RM ug/mL 19.7       ED Treatment:   Medication Administration from 07/03/2023 2027 to 07/03/2023 2333       Date/Time Order Dose Route Action     07/03/2023 2051 EDT methylPREDNISolone sodium succinate (Solu-MEDROL) injection 80 mg 80 mg Intravenous Given     07/03/2023 2051 EDT ipratropium-albuterol (DUO-NEB) 0.5-2.5 mg/3 mL inhalation solution 3 mL 3 mL Nebulization Given     07/03/2023 2313 EDT azithromycin (ZITHROMAX) 500 mg in sodium chloride 0.9 % 250 mL IVPB 500 mg Intravenous New Bag        Admitting Diagnosis: Shortness of breath [R06.02]  Dyspnea [R06.00]  COPD exacerbation (HCC) [J44.1]  Acute on chronic respiratory failure with hypercapnia (HCC) [J96.22]  Cellulitis of right finger [V49.992]     Age/Sex: 50 y.o. female     Admission Orders: SCDs; neuro checks; Consistent Carbohydrate Diet. Blood glucose checks ACHS.       Scheduled Medications:  amLODIPine, 5 mg, Oral, Daily  cefepime, 2,000 mg, Intravenous, Q12H  guaiFENesin, 600 mg, Oral, BID  heparin (porcine), 5,000 Units, Subcutaneous, Q8H MYLA  hydroxychloroquine, 100 mg, Oral, BID  insulin lispro, 1-6 Units, Subcutaneous, TID AC  insulin lispro, 1-6 Units, Subcutaneous, HS  insulin lispro, 5 Units, Subcutaneous, TID With Meals  levalbuterol, 1.25 mg, Nebulization, TID  lidocaine, 1 patch, Topical, Daily  methylPREDNISolone sodium succinate, 40 mg, Intravenous, Q8H  nicotine, 1 patch, Transdermal, Daily  ofloxacin, 1 drop, Both Eyes, 4x Daily  sildenafil, 20 mg, Oral, TID  vancomycin, 1,000 mg, Intravenous, Q12H  HYDROmorphone HCl (DILAUDID) injection 0.2 mg  Dose: 0.2 mg  Freq: Once Route: IV  Start: 07/04/23 1830 End: 07/04/23 1919      Continuous IV Infusions: none     PRN Meds:  acetaminophen, 650 mg, Oral, Q6H PRN  ipratropium-albuterol, 3 mL, Nebulization, Q6H PRN (6/4 rec'd x1)  oxyCODONE, 5 mg, Oral, Q4H PRN  (7/4 rec'd x4)  simethicone, 80 mg, Oral, Q6H PRN (6/4 rec'd x1)        IP CONSULT TO PHARMACY  IP CONSULT TO ORTHOPEDIC SURGERY  IP CONSULT TO PULMONOLOGY    Network Utilization Review Department  ATTENTION: Please call with any questions or concerns to [phone] and carefully listen to the prompts so that you are directed to the right person. All voicemails are confidential.  [facility] all requests for admission clinical reviews, approved or denied determinations and any other requests to dedicated fax number below belonging to the campus where the patient is receiving treatment.  List of dedicated fax numbers for the Facilities:  [place] DENIALS (Administrative/Medical Necessity) [phone] [address] [facility] (Maternity/NICU/Pediatrics) [phone]   [address] [place] [address] [phone] 150[address] [facility] [phone]   [facility] [address] [facility] [phone]   [address] [facility] [address] [address] [facility] [address] [place] [address] [facility] [phone]

## 2023-07-05 NOTE — RESPIRATORY THERAPY NOTE
Resp Care       07/05/23 1330   Respiratory Assessment   Assessment Type During-treatment   General Appearance Alert; Awake   Respiratory Pattern Dyspnea with exertion   Chest Assessment Chest expansion symmetrical   Bilateral Breath Sounds Diminished   Cough Dry;Non-productive   Resp Comments no resp complaints. is feeling better than yesterday.  on baseline O2   O2 Device 3L

## 2023-07-05 NOTE — ASSESSMENT & PLAN NOTE
· Current smoker , has had multiple fingers amputated in the past   · States hands fluctuate color, no new or worsening pain per patient  · Continue amlodipine , Sildenafil   · Smoking cessation encouraged   · Neurovascular checks   · Consider to switch amlodipine to nifedipine considering patient's Raynaud's disease.

## 2023-07-05 NOTE — PROGRESS NOTES
Kirsten Kohler is a 50 y.o. female who is currently ordered Vancomycin IV with management by the Pharmacy Consult service. Relevant clinical data and objective / subjective history reviewed. Vancomycin Assessment:  Indication and Goal AUC/Trough: Soft tissue (goal -600, trough >10)  Clinical Status: stable  Micro:     Renal Function:  SCr: 0.75 mg/dL  CrCl: 86.6 mL/min  Renal replacement: Not on dialysis  Days of Therapy: 3  Current Dose: 1250 mg IV q 12 hours  Vancomycin Plan:  New Dosin mg IV q 12 hours  Estimated AUC: 538 mcg*hr/mL  Estimated Trough: 16.8 mcg/mL  Next Level: 23 at 0600  Renal Function Monitoring: Daily BMP and [place] will continue to follow closely for s/sx of nephrotoxicity, infusion reactions and appropriateness of therapy. BMP and CBC will be ordered per protocol. We will continue to follow the patient’s culture results and clinical progress daily.     Anna Weldon, Pharmacist

## 2023-07-05 NOTE — PROGRESS NOTES
[address] [facility]  Progress Note  Name: Luiza Smith  MRN: [id number]  Unit/Bed#: -01 I Date of Admission: 7/3/2023   Date of Service: 7/5/2023 I Hospital Day: 2    Assessment/Plan   Cellulitis of right index finger  Assessment & Plan  · Complicated history of Raynauds , smoking, multiple finger amputations in the past. Reportedly had partial amputation of right 2nd finger in [place] around May 2023. Recently admitted at [facility] in June 2023, had evaluation of right 2nd finger discoloration , appears was evaluated by Rheum and Orthopedics . No surgical intervention was recommended . Patient states she had follow-up outpatient appointment with orthopedics but was unable to get to her appointment    · Right 2nd finger with black/brown discoloration at the tip . No drainage noted. Mild swelling and erythema on exam (erythema appears similar to other fingers due to Raynaud's). Complains of pain that is not new or worsening. ·  Afebrile, no leukocytosis. · X-ray hand:Mildly comminuted fracture at the distal tuft of the right second digit. Erosive/resorptive changes of the distal tuft of the right fourth digit which may reflect phalangeal osteolysis given history of Raynaud's disease  · Appreciate orthopedics consult -recommendation is to transfer to tertiary center in the near future for hand surgery evaluation. As per chart review, patient recently evaluated at [facility], patient condition improved for blood culture shows bacteremia, at that time we will try to initiate transfer. The meantime we will try to stabilize patient COPD  · F/u blood cultures- Start cefepime and vancomyin for now   · Prn pain control  · Neurovascular checks   · Orthopedic recommendation appreciated, recommending to transfer to tertiary center in the near future.     · If patient blood culture comes back positive or patient is started having significant purulent discharge or uncontrollable pain in the hand, can consider to transfer to tertiary center to be evaluated by hand surgeon. * Acute exacerbation of chronic obstructive pulmonary disease (COPD) ([address])  Assessment & Plan  · Presents from home with shortness of breath . Per EMS received multiple nebulizer treatments pre hospital. Chronic respiratory failure on 3l NC at baseline. · Currently stable on baseline O2 3L   · VBG pH 7.31, CO2 65.8 (appears similar to ABG in June 2023 likely near baseline)   · Repeat VBG shows respiratory acidosis compensating. Continue high-dose of steroid, continue inhaled corticosteroid and nebulizer treatment  Pending formal pulmonary consult  Use BiPAP during sleeping hours as per pulmonary recommendation over the phone. Chest pain  Assessment & Plan  · Complaining of chest tightness and shortness of breath-remain chest pain-free.   · Troponins negative thus far   · EKG nonischemic   · Recent echo June 2023 shows EF 55%  · Likely in the setting of acute COPD exacerbation   · CTAP no PE noted    Thrombocytopenia (HCC)  Assessment & Plan  · Resolved  · No active bleeding noted    Pre-diabetes  Assessment & Plan  · Reports now off Metformin   · Place on SSI while inpatient , monitor closely while on IV steroids     Chronic respiratory failure (HCC)  Assessment & Plan  · Currently stable at baseline 3l NC  · In the setting of COPD   · Monitor respiratory status closely, continue treatment for acute COPD         Lupus ([address])  Assessment & Plan  · Recently moved from [facility] [place] she is working on establishing care here   · Continue Plaquenil for now, monitor Platelets closely     Raynaud's disease  Assessment & Plan  · Current smoker , has had multiple fingers amputated in the past   · States hands fluctuate color, no new or worsening pain per patient  · Continue amlodipine , Sildenafil   · Smoking cessation encouraged   · Neurovascular checks   · Consider to switch amlodipine to nifedipine considering patient's Raynaud's disease. VTE Pharmacologic Prophylaxis: VTE Score: 5 High Risk (Score >/= 5) - Pharmacological DVT Prophylaxis Ordered: heparin. Sequential Compression Devices Ordered. Patient Centered Rounds: I performed bedside rounds with nursing staff today. Discussions with Specialists or Other Care Team Provider: None    Education and Discussions with Family / Patient: with faraz. Total Time Spent on Date of Encounter in care of patient: 10 minutes This time was spent on one or more of the following: performing physical exam; counseling and coordination of care; obtaining or reviewing history; documenting in the medical record; reviewing/ordering tests, medications or procedures; communicating with other healthcare professionals and discussing with patient's family/caregivers. Current Length of Stay: 2 day(s)  Current Patient Status: Inpatient   Certification Statement: The patient will continue to require additional inpatient hospital stay due to To monitor above condition  Discharge Plan: Anticipate discharge in 48-72 hrs to home. Code Status: Level 3 - DNAR and DNI    Subjective:   Seen and evaluated during the event. Resting comfortably still having cough congestion and wheezing. He still complaining of significant of pain in the finger. Objective:     Vitals:   Temp (24hrs), Av.2 °F (36.8 °C), Min:97.9 °F (36.6 °C), Max:98.8 °F (37.1 °C)    Temp:  [97.9 °F (36.6 °C)-98.8 °F (37.1 °C)] 98.2 °F (36.8 °C)  HR:  [] 95  Resp:  [18] 18  BP: (117-129)/(58-75) 117/58  SpO2:  [86 %-96 %] 93 %  Body mass index is 32.35 kg/m². Input and Output Summary (last 24 hours): Intake/Output Summary (Last 24 hours) at 2023 1219  Last data filed at 2023 2926  Gross per 24 hour   Intake 780 ml   Output 400 ml   Net 380 ml       Physical Exam:   Physical Exam  Vitals and nursing note reviewed. Constitutional:       Appearance: She is ill-appearing.    HENT:      Mouth/Throat: Mouth: Mucous membranes are moist.      Pharynx: Oropharynx is clear. No oropharyngeal exudate. Eyes:      General: No scleral icterus. Left eye: No discharge. Extraocular Movements: Extraocular movements intact. Conjunctiva/sclera: Conjunctivae normal.      Pupils: Pupils are equal, round, and reactive to light. Cardiovascular:      Rate and Rhythm: Tachycardia present. Pulses: Normal pulses. Heart sounds: No friction rub. No gallop. Pulmonary:      Effort: Pulmonary effort is normal.      Breath sounds: Wheezing and rhonchi present. Abdominal:      General: Abdomen is flat. Bowel sounds are normal. There is no distension. Palpations: There is no mass. Tenderness: There is no abdominal tenderness. Hernia: No hernia is present. Musculoskeletal:         General: Tenderness (right index finger) present. Cervical back: Normal range of motion. Skin:     General: Skin is warm. Neurological:      General: No focal deficit present. Mental Status: She is alert and oriented to person, place, and time. Cranial Nerves: No cranial nerve deficit. Sensory: No sensory deficit. Motor: No weakness.       Coordination: Coordination normal.         Additional Data:     Labs:  Results from last 7 days   Lab Units 07/05/23  0549   WBC Thousand/uL 10.84*   HEMOGLOBIN g/dL 13.1   HEMATOCRIT % 44.8   PLATELETS Thousands/uL 251   NEUTROS PCT % 91*   LYMPHS PCT % 5*   MONOS PCT % 3*   EOS PCT % 0     Results from last 7 days   Lab Units 07/05/23  0549   SODIUM mmol/L 138   POTASSIUM mmol/L 4.6   CHLORIDE mmol/L 103   CO2 mmol/L 29   BUN mg/dL 24   CREATININE mg/dL 0.75   ANION GAP mmol/L 6   CALCIUM mg/dL 9.4   ALBUMIN g/dL 3.7   TOTAL BILIRUBIN mg/dL 0.24   ALK PHOS U/L 49   ALT U/L 10   AST U/L 10*   GLUCOSE RANDOM mg/dL 195*     Results from last 7 days   Lab Units 07/03/23  2040   INR  0.89     Results from last 7 days   Lab Units 07/05/23  1110 07/05/23  0708 07/04/23  2139 07/04/23  1539 07/04/23  1048 07/04/23  0711 07/04/23  0019   POC GLUCOSE mg/dl 298* 195* 151* 211* 302* 204* 257*         Results from last 7 days   Lab Units 07/04/23  0524 07/03/23 2040   LACTIC ACID mmol/L  --  1.6   PROCALCITONIN ng/ml <0.05 <0.05       Lines/Drains:  Invasive Devices     Peripheral Intravenous Line  Duration           Peripheral IV 07/03/23 Left Antecubital 1 day                      Imaging: No pertinent imaging reviewed. Recent Cultures (last 7 days):   Results from last 7 days   Lab Units 07/04/23  0944 07/03/23 2040 07/03/23 2029   BLOOD CULTURE   --  Received in Microbiology Lab. Culture in Progress. Received in Microbiology Lab. Culture in Progress.    GRAM STAIN RESULT  Rare Epithelial cells per low power field  1+ Polys  No bacteria seen  --   --        Last 24 Hours Medication List:   Current Facility-Administered Medications   Medication Dose Route Frequency Provider Last Rate   • acetaminophen  650 mg Oral Q6H PRN SAIDA Lucio     • amLODIPine  5 mg Oral Daily SAIDA Lucio     • cefepime  2,000 mg Intravenous Q12H SAIDA Lucio 2,000 mg (07/05/23 0100)   • gabapentin  100 mg Oral TID Sully Olsen MD     • guaiFENesin  600 mg Oral BID SAIDA Lucio     • heparin (porcine)  5,000 Units Subcutaneous [facility] SAIDA Lucio     • hydroxychloroquine  100 mg Oral BID Sully Olsen MD     • insulin lispro  1-6 Units Subcutaneous TID [facility] SAIDA Lucio     • insulin lispro  1-6 Units Subcutaneous HS SAIDA Lucio     • insulin lispro  5 Units Subcutaneous TID With Meals Sully Olsen MD     • ipratropium-albuterol  3 mL Nebulization Q6H PRN Sully Olsen MD     • levalbuterol  1.25 mg Nebulization TID SAIDA Lucio     • lidocaine  1 patch Topical Daily Sully Olsen MD     • methylPREDNISolone sodium succinate  40 mg Intravenous Q8H SAIDA Dacosta     • nicotine  1 patch Transdermal Daily SAIDA Dacosta     • ofloxacin  1 drop Both Eyes 4x Daily Zeke Tam MD     • oxyCODONE  5 mg Oral Q4H PRN SAIDA Dacosta     • sildenafil  20 mg Oral TID SAIDA Dacosta     • simethicone  80 mg Oral Q6H PRN Zeke Tam MD     • vancomycin  1,000 mg Intravenous Q12H Zeke Tam MD 1,000 mg (07/05/23 1120)        Today, Patient Was Seen By: Zeke Tam MD    **Please Note: This note may have been constructed using a voice recognition system. **

## 2023-07-05 NOTE — PLAN OF CARE
Problem: PAIN - ADULT  Goal: Verbalizes/displays adequate comfort level or baseline comfort level  Description: Interventions:  - Encourage patient to monitor pain and request assistance  - Assess pain using appropriate pain scale  - Administer analgesics based on type and severity of pain and evaluate response  - Implement non-pharmacological measures as appropriate and evaluate response  - Consider cultural and social influences on pain and pain management  - Notify physician/advanced practitioner if interventions unsuccessful or patient reports new pain  Outcome: Progressing     Problem: INFECTION - ADULT  Goal: Absence or prevention of progression during hospitalization  Description: INTERVENTIONS:  - Assess and monitor for signs and symptoms of infection  - Monitor lab/diagnostic results  - Monitor all insertion sites, i.e. indwelling lines, tubes, and drains  - Monitor endotracheal if appropriate and nasal secretions for changes in amount and color  - [place] appropriate cooling/warming therapies per order  - Administer medications as ordered  - Instruct and encourage patient and family to use good hand hygiene technique  - Identify and instruct in appropriate isolation precautions for identified infection/condition  Outcome: Progressing     Problem: SAFETY ADULT  Goal: Patient will remain free of falls  Description: INTERVENTIONS:  - Educate patient/family on patient safety including physical limitations  - Instruct patient to call for assistance with activity   - Consult OT/PT to assist with strengthening/mobility   - Keep Call bell within reach  - Keep bed low and locked with side rails adjusted as appropriate  - Keep care items and personal belongings within reach  - Initiate and maintain comfort rounds  - Make Fall Risk Sign visible to staff  - Offer Toileting every  Hours, in advance of need  - Initiate/Maintain alarm  - Obtain necessary fall risk management equipment:   - Apply yellow socks and bracelet for high fall risk patients  - Consider moving patient to room near nurses station  Outcome: Progressing  Goal: Maintain or return to baseline ADL function  Description: INTERVENTIONS:  -  Assess patient's ability to carry out ADLs; assess patient's baseline for ADL function and identify physical deficits which impact ability to perform ADLs (bathing, care of mouth/teeth, toileting, grooming, dressing, etc.)  - Assess/evaluate cause of self-care deficits   - Assess range of motion  - Assess patient's mobility; develop plan if impaired  - Assess patient's need for assistive devices and provide as appropriate  - Encourage maximum independence but intervene and supervise when necessary  - Involve family in performance of ADLs  - Assess for home care needs following discharge   - Consider OT consult to assist with ADL evaluation and planning for discharge  - Provide patient education as appropriate  Outcome: Progressing  Goal: Maintains/Returns to pre admission functional level  Description: INTERVENTIONS:  - Perform BMAT or MOVE assessment daily.   - Set and communicate daily mobility goal to care team and patient/family/caregiver. - Collaborate with rehabilitation services on mobility goals if consulted  - Perform Range of Motion  times a day. - Reposition patient every  hours.   - Dangle patient times a day  - Stand patient  times a day  - Ambulate patient  times a day  - Out of bed to chair  times a day   - Out of bed for meals  times a day  - Out of bed for toileting  - Record patient progress and toleration of activity level   Outcome: Progressing     Problem: DISCHARGE PLANNING  Goal: Discharge to home or other facility with appropriate resources  Description: INTERVENTIONS:  - Identify barriers to discharge w/patient and caregiver  - Arrange for needed discharge resources and transportation as appropriate  - Identify discharge learning needs (meds, wound care, etc.)  - Arrange for interpretive services to assist at discharge as needed  - Refer to Case Management Department for coordinating discharge planning if the patient needs post-hospital services based on physician/advanced practitioner order or complex needs related to functional status, cognitive ability, or social support system  Outcome: Progressing     Problem: Knowledge Deficit  Goal: Patient/family/caregiver demonstrates understanding of disease process, treatment plan, medications, and discharge instructions  Description: Complete learning assessment and assess knowledge base. Interventions:  - Provide teaching at level of understanding  - Provide teaching via preferred learning methods  Outcome: Progressing     Problem: MOBILITY - ADULT  Goal: Maintain or return to baseline ADL function  Description: INTERVENTIONS:  -  Assess patient's ability to carry out ADLs; assess patient's baseline for ADL function and identify physical deficits which impact ability to perform ADLs (bathing, care of mouth/teeth, toileting, grooming, dressing, etc.)  - Assess/evaluate cause of self-care deficits   - Assess range of motion  - Assess patient's mobility; develop plan if impaired  - Assess patient's need for assistive devices and provide as appropriate  - Encourage maximum independence but intervene and supervise when necessary  - Involve family in performance of ADLs  - Assess for home care needs following discharge   - Consider OT consult to assist with ADL evaluation and planning for discharge  - Provide patient education as appropriate  Outcome: Progressing  Goal: Maintains/Returns to pre admission functional level  Description: INTERVENTIONS:  - Perform BMAT or MOVE assessment daily.   - Set and communicate daily mobility goal to care team and patient/family/caregiver. - Collaborate with rehabilitation services on mobility goals if consulted  - Perform Range of Motion  times a day. - Reposition patient every  hours.   - Dangle patient  times a day  - Stand patient  times a day  - Ambulate patient  times a day  - Out of bed to chair  times a day   - Out of bed for meals times a day  - Out of bed for toileting  - Record patient progress and toleration of activity level   Outcome: Progressing

## 2023-07-05 NOTE — ASSESSMENT & PLAN NOTE
· Complicated history of Raynauds , smoking, multiple finger amputations in the past. Reportedly had partial amputation of right 2nd finger in [place] around May 2023. Recently admitted at [facility] in June 2023, had evaluation of right 2nd finger discoloration , appears was evaluated by Rheum and Orthopedics . No surgical intervention was recommended . Patient states she had follow-up outpatient appointment with orthopedics but was unable to get to her appointment    · Right 2nd finger with black/brown discoloration at the tip . No drainage noted. Mild swelling and erythema on exam (erythema appears similar to other fingers due to Raynaud's). Complains of pain that is not new or worsening. ·  Afebrile, no leukocytosis. · X-ray hand:Mildly comminuted fracture at the distal tuft of the right second digit. Erosive/resorptive changes of the distal tuft of the right fourth digit which may reflect phalangeal osteolysis given history of Raynaud's disease  · Appreciate orthopedics consult -recommendation is to transfer to tertiary center in the near future for hand surgery evaluation. As per chart review, patient recently evaluated at [facility], patient condition improved for blood culture shows bacteremia, at that time we will try to initiate transfer. The meantime we will try to stabilize patient COPD  · F/u blood cultures- Start cefepime and vancomyin for now   · Prn pain control  · Neurovascular checks   · Orthopedic recommendation appreciated, recommending to transfer to tertiary center in the near future. · If patient blood culture comes back positive or patient is started having significant purulent discharge or uncontrollable pain in the hand, can consider to transfer to tertiary center to be evaluated by hand surgeon.

## 2023-07-06 LAB
ALBUMIN SERPL BCP-MCNC: 3.7 G/DL (ref 3.5–5)
ALP SERPL-CCNC: 46 U/L (ref 34–104)
ALT SERPL W P-5'-P-CCNC: 7 U/L (ref 7–52)
ANION GAP SERPL CALCULATED.3IONS-SCNC: 8 MMOL/L
AST SERPL W P-5'-P-CCNC: 17 U/L (ref 13–39)
BACTERIA SPT RESP CULT: NORMAL
BACTERIA UR CULT: ABNORMAL
BILIRUB SERPL-MCNC: 0.32 MG/DL (ref 0.2–1)
BUN SERPL-MCNC: 25 MG/DL (ref 5–25)
CALCIUM SERPL-MCNC: 9.3 MG/DL (ref 8.4–10.2)
CHLORIDE SERPL-SCNC: 98 MMOL/L (ref 96–108)
CO2 SERPL-SCNC: 28 MMOL/L (ref 21–32)
CREAT SERPL-MCNC: 0.7 MG/DL (ref 0.6–1.3)
ERYTHROCYTE [DISTWIDTH] IN BLOOD BY AUTOMATED COUNT: 18.1 % (ref 11.6–15.1)
GFR SERPL CREATININE-BSD FRML MDRD: 102 ML/MIN/1.73SQ M
GLUCOSE SERPL-MCNC: 210 MG/DL (ref 65–140)
GLUCOSE SERPL-MCNC: 230 MG/DL (ref 65–140)
GLUCOSE SERPL-MCNC: 269 MG/DL (ref 65–140)
GLUCOSE SERPL-MCNC: 281 MG/DL (ref 65–140)
GLUCOSE SERPL-MCNC: 290 MG/DL (ref 65–140)
GRAM STN SPEC: NORMAL
HCT VFR BLD AUTO: 46.2 % (ref 34.8–46.1)
HGB BLD-MCNC: 13.5 G/DL (ref 11.5–15.4)
MCH RBC QN AUTO: 24.6 PG (ref 26.8–34.3)
MCHC RBC AUTO-ENTMCNC: 29.2 G/DL (ref 31.4–37.4)
MCV RBC AUTO: 84 FL (ref 82–98)
PLATELET # BLD AUTO: 203 THOUSANDS/UL (ref 149–390)
PMV BLD AUTO: 9.4 FL (ref 8.9–12.7)
POTASSIUM SERPL-SCNC: 5.4 MMOL/L (ref 3.5–5.3)
PROT SERPL-MCNC: 7.2 G/DL (ref 6.4–8.4)
RBC # BLD AUTO: 5.48 MILLION/UL (ref 3.81–5.12)
SODIUM SERPL-SCNC: 134 MMOL/L (ref 135–147)
WBC # BLD AUTO: 7.51 THOUSAND/UL (ref 4.31–10.16)

## 2023-07-06 PROCEDURE — 94760 N-INVAS EAR/PLS OXIMETRY 1: CPT

## 2023-07-06 PROCEDURE — 94640 AIRWAY INHALATION TREATMENT: CPT

## 2023-07-06 PROCEDURE — 80053 COMPREHEN METABOLIC PANEL: CPT | Performed by: FAMILY MEDICINE

## 2023-07-06 PROCEDURE — 85027 COMPLETE CBC AUTOMATED: CPT | Performed by: FAMILY MEDICINE

## 2023-07-06 PROCEDURE — 99232 SBSQ HOSP IP/OBS MODERATE 35: CPT | Performed by: INTERNAL MEDICINE

## 2023-07-06 PROCEDURE — 82948 REAGENT STRIP/BLOOD GLUCOSE: CPT

## 2023-07-06 PROCEDURE — 99223 1ST HOSP IP/OBS HIGH 75: CPT | Performed by: INTERNAL MEDICINE

## 2023-07-06 RX ORDER — OXYCODONE HYDROCHLORIDE 5 MG/1
5 TABLET ORAL EVERY 4 HOURS PRN
Status: DISCONTINUED | OUTPATIENT
Start: 2023-07-06 | End: 2023-07-08 | Stop reason: HOSPADM

## 2023-07-06 RX ORDER — OXYCODONE HYDROCHLORIDE 10 MG/1
10 TABLET ORAL EVERY 4 HOURS PRN
Status: DISCONTINUED | OUTPATIENT
Start: 2023-07-06 | End: 2023-07-07

## 2023-07-06 RX ORDER — PREDNISONE 20 MG/1
40 TABLET ORAL DAILY
Status: DISCONTINUED | OUTPATIENT
Start: 2023-07-07 | End: 2023-07-07

## 2023-07-06 RX ORDER — INSULIN GLARGINE 100 [IU]/ML
20 INJECTION, SOLUTION SUBCUTANEOUS
Status: DISCONTINUED | OUTPATIENT
Start: 2023-07-06 | End: 2023-07-07

## 2023-07-06 RX ADMIN — LEVALBUTEROL HYDROCHLORIDE 1.25 MG: 1.25 SOLUTION RESPIRATORY (INHALATION) at 07:29

## 2023-07-06 RX ADMIN — CEFEPIME HYDROCHLORIDE 2000 MG: 2 INJECTION, SOLUTION INTRAVENOUS at 23:50

## 2023-07-06 RX ADMIN — INSULIN LISPRO 5 UNITS: 100 INJECTION, SOLUTION INTRAVENOUS; SUBCUTANEOUS at 08:29

## 2023-07-06 RX ADMIN — LEVALBUTEROL HYDROCHLORIDE 1.25 MG: 1.25 SOLUTION RESPIRATORY (INHALATION) at 20:28

## 2023-07-06 RX ADMIN — OFLOXACIN 1 DROP: 3 SOLUTION OPHTHALMIC at 12:01

## 2023-07-06 RX ADMIN — GABAPENTIN 100 MG: 100 CAPSULE ORAL at 15:56

## 2023-07-06 RX ADMIN — INSULIN LISPRO 2 UNITS: 100 INJECTION, SOLUTION INTRAVENOUS; SUBCUTANEOUS at 08:28

## 2023-07-06 RX ADMIN — GUAIFENESIN 600 MG: 600 TABLET ORAL at 08:26

## 2023-07-06 RX ADMIN — GABAPENTIN 100 MG: 100 CAPSULE ORAL at 08:26

## 2023-07-06 RX ADMIN — HYDROXYCHLOROQUINE SULFATE 100 MG: 200 TABLET, FILM COATED ORAL at 15:56

## 2023-07-06 RX ADMIN — SILDENAFIL 20 MG: 20 TABLET ORAL at 15:56

## 2023-07-06 RX ADMIN — INSULIN GLARGINE 20 UNITS: 100 INJECTION, SOLUTION SUBCUTANEOUS at 21:49

## 2023-07-06 RX ADMIN — INSULIN LISPRO 5 UNITS: 100 INJECTION, SOLUTION INTRAVENOUS; SUBCUTANEOUS at 16:02

## 2023-07-06 RX ADMIN — VANCOMYCIN HYDROCHLORIDE 1000 MG: 1 INJECTION, SOLUTION INTRAVENOUS at 10:28

## 2023-07-06 RX ADMIN — NICOTINE 1 PATCH: 14 PATCH, EXTENDED RELEASE TRANSDERMAL at 08:28

## 2023-07-06 RX ADMIN — HEPARIN SODIUM 5000 UNITS: 5000 INJECTION INTRAVENOUS; SUBCUTANEOUS at 14:18

## 2023-07-06 RX ADMIN — INSULIN LISPRO 3 UNITS: 100 INJECTION, SOLUTION INTRAVENOUS; SUBCUTANEOUS at 16:02

## 2023-07-06 RX ADMIN — CEFEPIME HYDROCHLORIDE 2000 MG: 2 INJECTION, SOLUTION INTRAVENOUS at 00:00

## 2023-07-06 RX ADMIN — AMLODIPINE BESYLATE 5 MG: 5 TABLET ORAL at 08:26

## 2023-07-06 RX ADMIN — GABAPENTIN 100 MG: 100 CAPSULE ORAL at 21:49

## 2023-07-06 RX ADMIN — SIMETHICONE 80 MG: 80 TABLET, CHEWABLE ORAL at 22:19

## 2023-07-06 RX ADMIN — CEFEPIME HYDROCHLORIDE 2000 MG: 2 INJECTION, SOLUTION INTRAVENOUS at 12:02

## 2023-07-06 RX ADMIN — OFLOXACIN 1 DROP: 3 SOLUTION OPHTHALMIC at 21:49

## 2023-07-06 RX ADMIN — OXYCODONE HYDROCHLORIDE 10 MG: 10 TABLET ORAL at 20:04

## 2023-07-06 RX ADMIN — HEPARIN SODIUM 5000 UNITS: 5000 INJECTION INTRAVENOUS; SUBCUTANEOUS at 21:49

## 2023-07-06 RX ADMIN — INSULIN LISPRO 3 UNITS: 100 INJECTION, SOLUTION INTRAVENOUS; SUBCUTANEOUS at 21:49

## 2023-07-06 RX ADMIN — LEVALBUTEROL HYDROCHLORIDE 1.25 MG: 1.25 SOLUTION RESPIRATORY (INHALATION) at 13:42

## 2023-07-06 RX ADMIN — SILDENAFIL 20 MG: 20 TABLET ORAL at 21:49

## 2023-07-06 RX ADMIN — OFLOXACIN 1 DROP: 3 SOLUTION OPHTHALMIC at 15:58

## 2023-07-06 RX ADMIN — OFLOXACIN 1 DROP: 3 SOLUTION OPHTHALMIC at 08:28

## 2023-07-06 RX ADMIN — OXYCODONE HYDROCHLORIDE 5 MG: 5 TABLET ORAL at 08:21

## 2023-07-06 RX ADMIN — GUAIFENESIN 600 MG: 600 TABLET ORAL at 15:56

## 2023-07-06 RX ADMIN — INSULIN LISPRO 4 UNITS: 100 INJECTION, SOLUTION INTRAVENOUS; SUBCUTANEOUS at 12:01

## 2023-07-06 RX ADMIN — METHYLPREDNISOLONE SODIUM SUCCINATE 40 MG: 40 INJECTION, POWDER, FOR SOLUTION INTRAMUSCULAR; INTRAVENOUS at 12:01

## 2023-07-06 RX ADMIN — HEPARIN SODIUM 5000 UNITS: 5000 INJECTION INTRAVENOUS; SUBCUTANEOUS at 05:11

## 2023-07-06 RX ADMIN — OXYCODONE HYDROCHLORIDE 5 MG: 5 TABLET ORAL at 14:18

## 2023-07-06 RX ADMIN — HYDROXYCHLOROQUINE SULFATE 100 MG: 200 TABLET, FILM COATED ORAL at 09:06

## 2023-07-06 RX ADMIN — INSULIN LISPRO 5 UNITS: 100 INJECTION, SOLUTION INTRAVENOUS; SUBCUTANEOUS at 12:02

## 2023-07-06 RX ADMIN — VANCOMYCIN HYDROCHLORIDE 1000 MG: 1 INJECTION, SOLUTION INTRAVENOUS at 22:16

## 2023-07-06 RX ADMIN — SILDENAFIL 20 MG: 20 TABLET ORAL at 08:26

## 2023-07-06 RX ADMIN — METHYLPREDNISOLONE SODIUM SUCCINATE 40 MG: 40 INJECTION, POWDER, FOR SOLUTION INTRAMUSCULAR; INTRAVENOUS at 05:11

## 2023-07-06 NOTE — ASSESSMENT & PLAN NOTE
· Prediabetes with steroid-induced hyperglycemia.     · Continue sliding scale insulin but add glargine for now    Results from last 7 days   Lab Units 07/06/23  1602 07/06/23  1112 07/06/23  0751 07/05/23  2101 07/05/23  1615 07/05/23  1110   POC GLUCOSE mg/dl 269* 281* 210* 283* 209* 298*

## 2023-07-06 NOTE — PROGRESS NOTES
Mk Santillan is a 50 y.o. female who is currently ordered Vancomycin IV with management by the Pharmacy Consult service. Relevant clinical data and objective / subjective history reviewed. Vancomycin Assessment:  Indication and Goal AUC/Trough: Soft tissue (goal -600, trough >10)  Clinical Status: stable  Micro:   *  Renal Function:  SCr: 0.70 mg/dL  CrCl: 92.8 mL/min  Renal replacement: Not on dialysis  Days of Therapy: 4  Current Dose: 1000mg IV Q12H  Vancomycin Plan:  New Dosing: continue 1000mg IV Q12H  Estimated AUC: 512 mcg*hr/mL  Estimated Trough: 15.7 mcg/mL  Next Level: 7/11/23 @ 0600  Renal Function Monitoring: Daily BMP and [place] will continue to follow closely for s/sx of nephrotoxicity, infusion reactions and appropriateness of therapy. BMP and CBC will be ordered per protocol. We will continue to follow the patient’s culture results and clinical progress daily.     Ritesh Nicolas, Pharmacist

## 2023-07-06 NOTE — RESPIRATORY THERAPY NOTE
RT Ventilator Management Note  Roe Magana 50 y.o. female MRN: [id number]  Unit/Bed#: -01 Encounter: [id number]      Daily Screen    No data found in the last 10 encounters. Physical Exam:   Subjective Data: hours of sleep      Resp Comments: patient placed on hours of sleep CPAP at 2325. Patient tolerating CPAP fairly well before removing mask at 0210, requesting her nurse give her a break.   She returned to CPAP at 0300

## 2023-07-06 NOTE — PROGRESS NOTES
[address] [facility]  Progress Note  Name: Miriam Guan  MRN: [id number]  Unit/Bed#: -01 I Date of Admission: 7/3/2023   Date of Service: 7/6/2023 I Hospital Day: 3    Assessment/Plan   No new Assessment & Plan notes have been filed under this hospital service since the last note was generated. Service: Hospitalist    VTE Pharmacologic Prophylaxis: VTE Score: 5 High Risk (Score >/= 5) - Pharmacological DVT Prophylaxis Ordered: heparin. Sequential Compression Devices Ordered. Patient Centered Rounds: I have performed bedside rounds with nursing staff today. Discussions with Specialists or Other Care Team Provider: Pulmonology and case management    Education and Discussions with Family / Patient: Attempted to update  (son) via phone. Left voicemail. Time Spent for Care: This time was spent on one or more of the following: performing physical exam; counseling and coordination of care; obtaining or reviewing history; documenting in the medical record; reviewing/ordering tests, medications or procedures; communicating with other healthcare professionals and discussing with patient's family/caregivers. Current Length of Stay: 3 day(s)  Current Patient Status: Inpatient   Certification Statement: The patient will continue to require additional inpatient hospital stay due to COPD exacerbation and finger cellulitis  Discharge Plan: Anticipate discharge in 24-48 hrs to home with home services. Code Status: Level 3 - DNAR and DNI      Subjective:   Patient seen and examined. Still having finger pain    Objective:   Vitals: Blood pressure 116/64, pulse 76, temperature 98.4 °F (36.9 °C), resp. rate 16, height 5' 1" (1.549 m), weight 77.7 kg (171 lb 3.2 oz), SpO2 97 %. Intake/Output Summary (Last 24 hours) at 7/6/2023 1725  Last data filed at 7/5/2023 2221  Gross per 24 hour   Intake 180 ml   Output 200 ml   Net -20 ml       Physical Exam  Vitals reviewed. Constitutional:       General: She is not in acute distress. Appearance: Normal appearance. HENT:      Head: Atraumatic. Eyes:      General: No scleral icterus. Cardiovascular:      Rate and Rhythm: Regular rhythm. Heart sounds: Normal heart sounds. Pulmonary:      Breath sounds: Decreased breath sounds present. No wheezing. Abdominal:      General: Bowel sounds are normal.      Palpations: Abdomen is soft. Tenderness: There is no guarding or rebound. Musculoskeletal:         General: Deformity (Multiple finger amputations and erythema of distal second finger) present. No swelling. Skin:     General: Skin is warm. Neurological:      Mental Status: She is alert.    Psychiatric:         Mood and Affect: Mood normal.       Additional Data:   Labs:  Results from last 7 days   Lab Units 07/06/23  0854 07/05/23  0549 07/04/23  0524 07/03/23 2040   WBC Thousand/uL 7.51 10.84* 3.97* 5.04   HEMOGLOBIN g/dL 13.5 13.1 12.9 14.2   PLATELETS Thousands/uL 203 251 199 123*   MCV fL 84 84 84 84   TOTAL NEUT ABS Thousand/uL  --   --  3.65  --    INR   --   --   --  0.89     Results from last 7 days   Lab Units 07/06/23  0854 07/05/23  0549 07/04/23  0524   SODIUM mmol/L 134* 138 137   POTASSIUM mmol/L 5.4* 4.6 3.9   CHLORIDE mmol/L 98 103 104   CO2 mmol/L 28 29 27   ANION GAP mmol/L 8 6 6   BUN mg/dL 25 24 15   CREATININE mg/dL 0.70 0.75 0.63   CALCIUM mg/dL 9.3 9.4 8.9   ALBUMIN g/dL 3.7 3.7 3.5   TOTAL BILIRUBIN mg/dL 0.32 0.24 0.25   ALK PHOS U/L 46 49 46   ALT U/L 7 10 4*   AST U/L 17 10* 8*   EGFR ml/min/1.73sq m 102 94 106   GLUCOSE RANDOM mg/dL 290* 195* 213*     Results from last 7 days   Lab Units 07/03/23  2040   MAGNESIUM mg/dL 2.3         Results from last 7 days   Lab Units 07/04/23  0052 07/03/23  2236 07/03/23  2040   HS TNI 0HR ng/L  --   --  8   HS TNI 2HR ng/L  --  9  --    HS TNI 4HR ng/L 8  --   --           Results from last 7 days   Lab Units 07/04/23  0524 07/03/23 2040   LACTIC ACID mmol/L  --  1.6   PROCALCITONIN ng/ml <0.05 <0.05     Results from last 7 days   Lab Units 07/06/23  1602 07/06/23  1112 07/06/23  0751 07/05/23  2101 07/05/23  1615 07/05/23  1110 07/05/23  0708 07/04/23  2139 07/04/23  1539 07/04/23  1048 07/04/23  0711 07/04/23  0019   POC GLUCOSE mg/dl 269* 281* 210* 283* 209* 298* 195* 151* 211* 302* 204* 257*             * I Have Reviewed All Lab Data Listed Above. Cultures:   Results from last 7 days   Lab Units 07/04/23  0944 07/04/23  0020 07/03/23 2040 07/03/23 2029   BLOOD CULTURE   --   --  No Growth at 48 hrs. No Growth at 48 hrs. SPUTUM CULTURE  1+ Growth of  --   --   --    GRAM STAIN RESULT  Rare Epithelial cells per low power field  1+ Polys  No bacteria seen  --   --   --    URINE CULTURE   --  >100,000 cfu/ml Escherichia coli*  --   --    INFLUENZA A PCR   --   --   --  Negative       Results from last 7 days   Lab Units 07/03/23 2029   SARS-COV-2  Negative   INFLUENZA A PCR  Negative   INFLUENZA B PCR  Negative   RSV PCR  Negative           Lines/Drains:  Invasive Devices     Peripheral Intravenous Line  Duration           Peripheral IV 07/06/23 Left;Ventral (anterior) Forearm <1 day              Telemetry:      Imaging:  Imaging Reports Reviewed Today Include:   XR chest 1 view portable    Result Date: 7/4/2023  Impression: No acute cardiopulmonary disease. Workstation performed: [id number]     CTA chest pe study    Result Date: 7/4/2023  Impression: No intraluminal filling defect to suggest an acute pulmonary embolus. Mild bilateral atelectatic and emphysematous lung changes with no focal infiltrate or pleural effusion. Workstation performed: [id number]     XR hand 2 vw right    Result Date: 7/4/2023  Impression: Mildly comminuted fracture at the distal tuft of the right second digit. Erosive/resorptive changes of the distal tuft of the right fourth digit which may reflect phalangeal osteolysis given history of Raynaud's disease.  Correlate with clinical exam. Workstation performed: [id number]       Scheduled Meds:  Current Facility-Administered Medications   Medication Dose Route Frequency Provider Last Rate   • acetaminophen  650 mg Oral Q6H PRN Rejeana Babinski Dimler, CRNP     • amLODIPine  5 mg Oral Daily Rejeana Babinski Dimler, CRNP     • cefepime  2,000 mg Intravenous Q12H Rejeana Babinski Dimler, CRNP 2,000 mg (07/06/23 1202)   • gabapentin  100 mg Oral TID Liss Barba MD     • guaiFENesin  600 mg Oral BID Rejeana Babinski Dimler, CRNP     • heparin (porcine)  5,000 Units Subcutaneous [facility] Rejeana Babinski Dimler, CRNP     • hydroxychloroquine  100 mg Oral BID Liss Barba MD     • insulin lispro  1-6 Units Subcutaneous TID [facility] Rejeana Babinski Dimler, CRNP     • insulin lispro  1-6 Units Subcutaneous HS Rejeana Babinski Dimler, CRNP     • insulin lispro  5 Units Subcutaneous TID With Meals Liss Barba MD     • ipratropium-albuterol  3 mL Nebulization Q6H PRN Liss Barba MD     • levalbuterol  1.25 mg Nebulization TID Rejeana Babinski Dimler, CRNP     • lidocaine  1 patch Topical Daily Liss Barba MD     • nicotine  1 patch Transdermal Daily Rejeana Babinski Dimler, CRNP     • ofloxacin  1 drop Both Eyes 4x Daily Liss Barba MD     • oxyCODONE  10 mg Oral Q4H PRN Mendoza Cobb DO     • oxyCODONE  5 mg Oral Q4H PRN Mendoza Cobb DO     • [START ON 7/7/2023] predniSONE  40 mg Oral Daily Heath Tan PA-C     • sildenafil  20 mg Oral TID Rejeana Babinski Dimler, CRNP     • simethicone  80 mg Oral Q6H PRN Liss Barba MD     • [START ON 7/7/2023] umeclidinium-vilanterol  1 puff Inhalation Daily Heath Tan PA-C     • vancomycin  1,000 mg Intravenous Q12H Liss Barba MD 1,000 mg (07/06/23 1028)       Today, Patient Was Seen By: Mendoza Cobb DO    ** Please Note: Dictation voice to text software may have been used in the creation of this document.  **

## 2023-07-06 NOTE — CONSULTS
Consultation - Pulmonary Medicine   Barbara Dunn 50 y.o. female MRN: [id number]  Unit/Bed#: -01 Encounter: [id number]      Assessment/Plan:    1. Chronic hypoxic respiratory failure  1. Reports to baseline oxygen requirement of 3 L nasal cannula continuously  2. Currently paying for oxygen out-of-pocket  3. Recommend home O2 eval prior to discharge  4. Maintain saturations greater than 88%  2. Emphysema/COPD of unknown severity with acute exacerbation  1. Discontinue Solu-Medrol after 2 doses today. Start prednisone 40 mg p.o. daily tomorrow. 2. Continue levalbuterol 3 times daily  3. Add Anoro Ellipta 1 puff daily  4. Check [facility] allergy panel given her significant decline since moving from [place] to here, question Asthma-COPD overlap syndrome??  5. At discharge recommend triple therapy such as Trelegy/Breztri and prn albuterol. Will have to wait and see what the status of her insurance is. Case management following. 3. Osteomyelitis of the right index finger  1. Currently on cefepime and vancomycin  2. Ortho consulted and recommending transfer to [facility] for evaluation by hand surgery. No surgical interventions planned at [address] currently. 3. Further management per primary team  4. Raynaud's  1. Currently on amlodipine, sildenafil  2. Further treatment per primary team  3. Recommend outpatient follow-up with rheumatology  5. Lupus  1. On Plaquenil  2. Further treatment per primary team while inpatient  3. Recommend establishing care with rheumatology as outpatient    History of Present Illness   Physician Requesting Consult: Pavel Orosco DO  Reason for Consult / Principal Problem: copd exacerbation   Hx and PE limited by: n/a  Chief Complaint: shortness of breath and finger pain  HPI: Barbara Dunn is a 50 y.o.  female who presented to [address] [place] [address] with complaints of COPD/emphysema, lupus, Raynaud's, prediabetes, chronic hypoxic respiratory failure on 3 L nasal cannula.   Patient presents to the hospital 7/3/2023 for increased chest tightness, dyspnea on exertion, nonproductive cough along with increased pain in her right index finger. Patient recently moved from [place] to the area to be with her oldest son. She states that since moving she has had progressively worsening respiratory symptoms as listed above. Denies fevers chills or sick contacts. Denies hemoptysis. Currently maintained on Breo and as needed albuterol which have not been giving her much relief. Does usually wear 3 L nasal cannula at baseline. Unfortunately is paying for oxygen out-of-pocket while she waits for her Medicaid to transfer to [place]. She is a current everyday smoker stating that she smokes about half a pack a day now but previously smoked 1 pack/day for the last 30 years. Patient is disabled. She denied previous occupational exposures to asbestos, silica, coal, dust, chemicals. So has a history of lupus on plaquenil and Raynaud's diagnosed in [place]. She has several finger amputations due to complications with Raynaud's most recently right index finger amputation to the DIP joint in May according to patient. Was incarcerated down in [place] and released February 2022. Inpatient consult to Pulmonology  Consult performed by: Leeanna Coleman PA-C  Consult ordered by: Hai Martin MD          Review of Systems   All other systems reviewed and are negative.       Historical Information   Past Medical History:   Diagnosis Date   • Borderline diabetic    • Cardiopulmonary arrest ([address])    • COPD (chronic obstructive pulmonary disease) ([address])    • Lupus ([address])    • Raynaud's disease    • Scleroderma ([address] [facility])      Past Surgical History:   Procedure Laterality Date   • FINGER AMPUTATION Bilateral     multiple     Social History   Social History     Substance and Sexual Activity   Alcohol Use Never     Social History     Substance and Sexual Activity   Drug Use Never     Social History     Tobacco Use Smoking Status Every Day   • Packs/day: 0.50   • Types: Cigarettes   Smokeless Tobacco Never     E-Cigarette/Vaping   • E-Cigarette Use Never User      E-Cigarette/Vaping Substances     Occupational History: disabled    Family History: History reviewed. No pertinent family history.     Meds/Allergies   all current active meds have been reviewed, pertinent pulmonary meds have been reviewed and current meds:   Current Facility-Administered Medications   Medication Dose Route Frequency   • acetaminophen (TYLENOL) tablet 650 mg  650 mg Oral Q6H PRN   • amLODIPine (NORVASC) tablet 5 mg  5 mg Oral Daily   • cefepime (MAXIPIME) IVPB (premix in dextrose) 2,000 mg 50 mL  2,000 mg Intravenous Q12H   • gabapentin (NEURONTIN) capsule 100 mg  100 mg Oral TID   • guaiFENesin (MUCINEX) 12 hr tablet 600 mg  600 mg Oral BID   • heparin (porcine) subcutaneous injection 5,000 Units  5,000 Units Subcutaneous Q8H [address]   • hydroxychloroquine (PLAQUENIL) tablet 100 mg  100 mg Oral BID   • insulin lispro (HumaLOG) 100 units/mL subcutaneous injection 1-6 Units  1-6 Units Subcutaneous TID AC   • insulin lispro (HumaLOG) 100 units/mL subcutaneous injection 1-6 Units  1-6 Units Subcutaneous HS   • insulin lispro (HumaLOG) 100 units/mL subcutaneous injection 5 Units  5 Units Subcutaneous TID With Meals   • ipratropium-albuterol (DUO-NEB) 0.5-2.5 mg/3 mL inhalation solution 3 mL  3 mL Nebulization Q6H PRN   • levalbuterol (XOPENEX) inhalation solution 1.25 mg  1.25 mg Nebulization TID   • lidocaine (LIDODERM) 5 % patch 1 patch  1 patch Topical Daily   • nicotine (NICODERM CQ) 14 mg/24hr TD 24 hr patch 1 patch  1 patch Transdermal Daily   • ofloxacin (OCUFLOX) 0.3 % ophthalmic solution 1 drop  1 drop Both Eyes 4x Daily   • oxyCODONE (ROXICODONE) immediate release tablet 10 mg  10 mg Oral Q4H PRN   • oxyCODONE (ROXICODONE) IR tablet 5 mg  5 mg Oral Q4H PRN   • [START ON 7/7/2023] predniSONE tablet 40 mg  40 mg Oral Daily   • sildenafil (REVATIO) tablet 20 mg  20 mg Oral TID   • simethicone (MYLICON) chewable tablet 80 mg  80 mg Oral Q6H PRN   • [START ON 7/7/2023] umeclidinium-vilanterol 62.5-25 mcg/actuation inhaler 1 puff  1 puff Inhalation Daily   • vancomycin (VANCOCIN) IVPB (premix in dextrose) 1,000 mg 200 mL  1,000 mg Intravenous Q12H       Allergies   Allergen Reactions   • Aspirin Hives       Objective   Vitals: Blood pressure 116/64, pulse 76, temperature 98.4 °F (36.9 °C), resp. rate 16, height 5' 1" (1.549 m), weight 77.7 kg (171 lb 3.2 oz), SpO2 97 %. 3L NC,Body mass index is 32.35 kg/m². Intake/Output Summary (Last 24 hours) at 7/6/2023 1710  Last data filed at 7/5/2023 2221  Gross per 24 hour   Intake 180 ml   Output 600 ml   Net -420 ml     Invasive Devices     Peripheral Intravenous Line  Duration           Peripheral IV 07/06/23 Left;Ventral (anterior) Forearm <1 day                Physical Exam  Vitals and nursing note reviewed. Constitutional:       General: She is not in acute distress. Appearance: Normal appearance. HENT:      Head: Normocephalic and atraumatic. Nose: Nose normal.      Mouth/Throat:      Mouth: Mucous membranes are moist.      Pharynx: Oropharynx is clear. Eyes:      Extraocular Movements: Extraocular movements intact. Cardiovascular:      Rate and Rhythm: Normal rate and regular rhythm. Heart sounds: No murmur heard. Pulmonary:      Effort: Pulmonary effort is normal.      Breath sounds: No wheezing. Comments: Decreased breath sounds bilaterally  Musculoskeletal:         General: Deformity (multiple well healed phalanges amputations, right index finger with previous ampuation at DIP joint with dry gangrenous appearance distally and more erythematous appearance proximal) present. Cervical back: Normal range of motion. No muscular tenderness. Skin:     General: Skin is warm and dry. Neurological:      General: No focal deficit present. Mental Status: She is alert.  Mental status is at baseline. Psychiatric:         Mood and Affect: Mood normal.         Behavior: Behavior normal.         Lab Results:   I have personally reviewed pertinent lab results. , ABG: No results found for: "PHART", "OVQ4RWK", "PO2ART", "CFA2PUL", "C1AVOQFE", "BEART", "SOURCE", BNP: No results found for: "BNP", CBC:   Lab Results   Component Value Date    WBC 7.51 07/06/2023    HGB 13.5 07/06/2023    HCT 46.2 (H) 07/06/2023    MCV 84 07/06/2023     07/06/2023    RBC 5.48 (H) 07/06/2023    MCH 24.6 (L) 07/06/2023    MCHC 29.2 (L) 07/06/2023    RDW 18.1 (H) 07/06/2023    MPV 9.4 07/06/2023   , CMP:   Lab Results   Component Value Date    SODIUM 134 (L) 07/06/2023    K 5.4 (H) 07/06/2023    CL 98 07/06/2023    CO2 28 07/06/2023    BUN 25 07/06/2023    CREATININE 0.70 07/06/2023    CALCIUM 9.3 07/06/2023    AST 17 07/06/2023    ALT 7 07/06/2023    ALKPHOS 46 07/06/2023    EGFR 102 07/06/2023   , PT/INR: No results found for: "PT", "INR", Troponin: No results found for: "TROPONINI"      Imaging Studies: I have personally reviewed pertinent reports. and I have personally reviewed pertinent films in PACS     CTA chest PE study 7/4/2023  No PE. Mild bilateral atelectasis and emphysematous changes. EKG, Pathology, and Other Studies: I have personally reviewed pertinent reports. Pulmonary Results (PFTs, PSG): none to review      VTE Prophylaxis: Heparin    Code Status: Level 3 - DNAR and DNI    Portions of the record may have been created with voice recognition software. Occasional wrong word or "sound a like" substitutions may have occurred due to the inherent limitations of voice recognition software. Read the chart carefully and recognize, using context, where substitutions have occurred.

## 2023-07-06 NOTE — RESPIRATORY THERAPY NOTE
RT Protocol Note  Cassy Abernathy 50 y.o. female MRN: [id number]  Unit/Bed#: -01 Encounter: [id number]    Assessment    Principal Problem:    Acute exacerbation of chronic obstructive pulmonary disease (COPD) ([address]  Active Problems:    Raynaud's disease    Lupus ([address])    Chronic respiratory failure (HCC)    Cellulitis of right index finger    Pre-diabetes    Thrombocytopenia (HCC)    Chest pain      Home Pulmonary Medications:    Home Devices/Therapy: Home O2    Past Medical History:   Diagnosis Date   • Borderline diabetic    • Cardiopulmonary arrest ([address] [facility])    • COPD (chronic obstructive pulmonary disease) (HCC)    • Lupus (HCC)    • Raynaud's disease    • Scleroderma ([address])      Social History     Socioeconomic History   • Marital status: Single     Spouse name: None   • Number of children: None   • Years of education: None   • Highest education level: None   Occupational History   • None   Tobacco Use   • Smoking status: Every Day     Packs/day: 0.50     Types: Cigarettes   • Smokeless tobacco: Never   Vaping Use   • Vaping Use: Never used   Substance and Sexual Activity   • Alcohol use: Never   • Drug use: Never   • Sexual activity: None   Other Topics Concern   • None   Social History Narrative   • None     Social Determinants of Health     Financial Resource Strain: Not on file   Food Insecurity: Not on file   Transportation Needs: Not on file   Physical Activity: Not on file   Stress: Not on file   Social Connections: Not on file   Intimate Partner Violence: Not on file   Housing Stability: Not on file       Subjective    Subjective Data: hours of sleep    Objective    Physical Exam:        Vitals:  Blood pressure 127/69, pulse 87, temperature 98.1 °F (36.7 °C), resp. rate 18, height 5' 1" (1.549 m), weight 77.7 kg (171 lb 3.2 oz), SpO2 97 %. Imaging and other studies: I have personally reviewed pertinent reports.           Plan

## 2023-07-06 NOTE — CASE MANAGEMENT
Case Management Discharge Planning Note    Patient name Ina Bello  Location /-01 MRN [id number]  : 1975 Date 2023       Current Admission Date: 7/3/2023  Current Admission Diagnosis:Acute exacerbation of chronic obstructive pulmonary disease (COPD) ([address])   Patient Active Problem List    Diagnosis Date Noted   • Cellulitis of right index finger    • Pre-diabetes    • Thrombocytopenia (HCC)    • Chest pain    • Raynaud's disease    • Lupus ([address])    • Acute exacerbation of chronic obstructive pulmonary disease (COPD) ([address])    • Chronic respiratory failure (HCC)       LOS (days): 3  Geometric Mean LOS (GMLOS) (days):   Days to GMLOS:     OBJECTIVE:  Risk of Unplanned Readmission Score: 10.53         Current admission status: Inpatient   Preferred Pharmacy: No Pharmacies Listed  Primary Care Provider: Nelida Adams DO    Primary Insurance:   Secondary Insurance:     DISCHARGE DETAILS:        CM reviewed patients chart. Patient was at [address] [place] [address] 23 for osteo and scheduled to establish PCP with Dr Nelida Adams, 23. CM contacted PCP to see if appt still scheduled and appt confimred for patient to establish Primary Care. CM also found PCP appt for patient 23 with Jono Serrano, 23 following discharge from [facility]. CM conatcted Jono Ambriz to learn patient did not keep 23 appt with Charli. CM uploaded Dr Nelida Adams as patients PCP in [facility] system due to [facility] appt and location being in vacinity of patients home address. CM submitted email to [employer] to assess if patient has pending medical insurance.

## 2023-07-06 NOTE — ASSESSMENT & PLAN NOTE
· Mild thrombocytopenia on admission.   Resolved    Results from last 7 days   Lab Units 07/06/23  0854 07/05/23  0549 07/04/23  0524 07/03/23  2040   PLATELETS Thousands/uL [phone]*

## 2023-07-06 NOTE — ASSESSMENT & PLAN NOTE
Background: History of mixed rheumatologic disease including Raynaud's and lupus with COPD recently relocated from [facility] to be with son here presented with shortness of breath and worsening erythema of right second finger  · Chronic respiratory failure/hypoxia  · Chronically on 3 L nasal cannula. Unfortunately still smoking. · Pulmonology following. Was IV methylprednisolone and being transitioned to prednisone.   · Due to hypoxia pulmonology has ordered recheck echocardiogram

## 2023-07-06 NOTE — PLAN OF CARE
Problem: PAIN - ADULT  Goal: Verbalizes/displays adequate comfort level or baseline comfort level  Description: Interventions:  - Encourage patient to monitor pain and request assistance  - Assess pain using appropriate pain scale  - Administer analgesics based on type and severity of pain and evaluate response  - Implement non-pharmacological measures as appropriate and evaluate response  - Consider cultural and social influences on pain and pain management  - Notify physician/advanced practitioner if interventions unsuccessful or patient reports new pain  Outcome: Progressing     Problem: INFECTION - ADULT  Goal: Absence or prevention of progression during hospitalization  Description: INTERVENTIONS:  - Assess and monitor for signs and symptoms of infection  - Monitor lab/diagnostic results  - Monitor all insertion sites, i.e. indwelling lines, tubes, and drains  - Monitor endotracheal if appropriate and nasal secretions for changes in amount and color  - Kinder appropriate cooling/warming therapies per order  - Administer medications as ordered  - Instruct and encourage patient and family to use good hand hygiene technique  - Identify and instruct in appropriate isolation precautions for identified infection/condition  Outcome: Progressing     Problem: SAFETY ADULT  Goal: Patient will remain free of falls  Description: INTERVENTIONS:  - Educate patient/family on patient safety including physical limitations  - Instruct patient to call for assistance with activity   - Consult OT/PT to assist with strengthening/mobility   - Keep Call bell within reach  - Keep bed low and locked with side rails adjusted as appropriate  - Keep care items and personal belongings within reach  - Initiate and maintain comfort rounds  - Make Fall Risk Sign visible to staff  - Offer Toileting every  Hours, in advance of need  - Initiate/Maintain alarm  - Obtain necessary fall risk management equipment:   - Apply yellow socks and bracelet for high fall risk patients  - Consider moving patient to room near nurses station  Outcome: Progressing  Goal: Maintain or return to baseline ADL function  Description: INTERVENTIONS:  -  Assess patient's ability to carry out ADLs; assess patient's baseline for ADL function and identify physical deficits which impact ability to perform ADLs (bathing, care of mouth/teeth, toileting, grooming, dressing, etc.)  - Assess/evaluate cause of self-care deficits   - Assess range of motion  - Assess patient's mobility; develop plan if impaired  - Assess patient's need for assistive devices and provide as appropriate  - Encourage maximum independence but intervene and supervise when necessary  - Involve family in performance of ADLs  - Assess for home care needs following discharge   - Consider OT consult to assist with ADL evaluation and planning for discharge  - Provide patient education as appropriate  Outcome: Progressing  Goal: Maintains/Returns to pre admission functional level  Description: INTERVENTIONS:  - Perform BMAT or MOVE assessment daily.   - Set and communicate daily mobility goal to care team and patient/family/caregiver. - Collaborate with rehabilitation services on mobility goals if consulted  - Perform Range of Motion  times a day. - Reposition patient every  hours.   - Dangle patient  times a day  - Stand patient  times a day  - Ambulate patient  times a day  - Out of bed to chair  times a day   - Out of bed for meals  times a day  - Out of bed for toileting  - Record patient progress and toleration of activity level   Outcome: Progressing     Problem: DISCHARGE PLANNING  Goal: Discharge to home or other facility with appropriate resources  Description: INTERVENTIONS:  - Identify barriers to discharge w/patient and caregiver  - Arrange for needed discharge resources and transportation as appropriate  - Identify discharge learning needs (meds, wound care, etc.)  - Arrange for interpretive services to assist at discharge as needed  - Refer to Case Management Department for coordinating discharge planning if the patient needs post-hospital services based on physician/advanced practitioner order or complex needs related to functional status, cognitive ability, or social support system  Outcome: Progressing     Problem: Knowledge Deficit  Goal: Patient/family/caregiver demonstrates understanding of disease process, treatment plan, medications, and discharge instructions  Description: Complete learning assessment and assess knowledge base. Interventions:  - Provide teaching at level of understanding  - Provide teaching via preferred learning methods  Outcome: Progressing     Problem: MOBILITY - ADULT  Goal: Maintain or return to baseline ADL function  Description: INTERVENTIONS:  -  Assess patient's ability to carry out ADLs; assess patient's baseline for ADL function and identify physical deficits which impact ability to perform ADLs (bathing, care of mouth/teeth, toileting, grooming, dressing, etc.)  - Assess/evaluate cause of self-care deficits   - Assess range of motion  - Assess patient's mobility; develop plan if impaired  - Assess patient's need for assistive devices and provide as appropriate  - Encourage maximum independence but intervene and supervise when necessary  - Involve family in performance of ADLs  - Assess for home care needs following discharge   - Consider OT consult to assist with ADL evaluation and planning for discharge  - Provide patient education as appropriate  Outcome: Progressing  Goal: Maintains/Returns to pre admission functional level  Description: INTERVENTIONS:  - Perform BMAT or MOVE assessment daily.   - Set and communicate daily mobility goal to care team and patient/family/caregiver. - Collaborate with rehabilitation services on mobility goals if consulted  - Perform Range of Motion  times a day. - Reposition patient every  hours.   - Dangle patient  times a day  - Stand patient  times a day  - Ambulate patient  times a day  - Out of bed to chair  times a day   - Out of bed for meals  times a day  - Out of bed for toileting  - Record patient progress and toleration of activity level   Outcome: Progressing

## 2023-07-06 NOTE — ASSESSMENT & PLAN NOTE
· History of Raynaud's with multiple amputations in [place]  · Currently on vancomycin and cefepime for cellulitis of right second fingertip  · Orthopedics consulted.   If worsens will transfer for hand surgery but currently stable

## 2023-07-07 ENCOUNTER — APPOINTMENT (INPATIENT)
Dept: NON INVASIVE DIAGNOSTICS | Facility: HOSPITAL | Age: 48
DRG: 383 | End: 2023-07-07
Payer: COMMERCIAL

## 2023-07-07 LAB
ALBUMIN SERPL BCP-MCNC: 3.2 G/DL (ref 3.5–5)
ALP SERPL-CCNC: 40 U/L (ref 34–104)
ALT SERPL W P-5'-P-CCNC: 8 U/L (ref 7–52)
ANION GAP SERPL CALCULATED.3IONS-SCNC: 7 MMOL/L
AORTIC ROOT: 3.2 CM
AORTIC VALVE MEAN VELOCITY: 10.2 M/S
APICAL FOUR CHAMBER EJECTION FRACTION: 44 %
AST SERPL W P-5'-P-CCNC: 11 U/L (ref 13–39)
AV MEAN GRADIENT: 5 MMHG
AV PEAK GRADIENT: 8 MMHG
BILIRUB SERPL-MCNC: 0.3 MG/DL (ref 0.2–1)
BUN SERPL-MCNC: 25 MG/DL (ref 5–25)
CALCIUM ALBUM COR SERPL-MCNC: 9.5 MG/DL (ref 8.3–10.1)
CALCIUM SERPL-MCNC: 8.9 MG/DL (ref 8.4–10.2)
CHLORIDE SERPL-SCNC: 103 MMOL/L (ref 96–108)
CO2 SERPL-SCNC: 26 MMOL/L (ref 21–32)
CREAT SERPL-MCNC: 0.64 MG/DL (ref 0.6–1.3)
DOP CALC AO PEAK VEL: 1.39 M/S
DOP CALC AO VTI: 28.23 CM
ERYTHROCYTE [DISTWIDTH] IN BLOOD BY AUTOMATED COUNT: 17.7 % (ref 11.6–15.1)
FRACTIONAL SHORTENING: 18 (ref 28–44)
GFR SERPL CREATININE-BSD FRML MDRD: 105 ML/MIN/1.73SQ M
GLUCOSE SERPL-MCNC: 103 MG/DL (ref 65–140)
GLUCOSE SERPL-MCNC: 115 MG/DL (ref 65–140)
GLUCOSE SERPL-MCNC: 124 MG/DL (ref 65–140)
GLUCOSE SERPL-MCNC: 130 MG/DL (ref 65–140)
GLUCOSE SERPL-MCNC: 255 MG/DL (ref 65–140)
HCT VFR BLD AUTO: 44.7 % (ref 34.8–46.1)
HGB BLD-MCNC: 12.8 G/DL (ref 11.5–15.4)
INTERVENTRICULAR SEPTUM IN DIASTOLE (PARASTERNAL SHORT AXIS VIEW): 0.8 CM
INTERVENTRICULAR SEPTUM: 0.8 CM (ref 0.6–1.1)
LEFT ATRIUM SIZE: 3.1 CM
LEFT INTERNAL DIMENSION IN SYSTOLE: 3.3 CM (ref 2.1–4)
LEFT VENTRICULAR INTERNAL DIMENSION IN DIASTOLE: 4 CM (ref 3.5–6)
LEFT VENTRICULAR POSTERIOR WALL IN END DIASTOLE: 0.8 CM
LEFT VENTRICULAR STROKE VOLUME: 25 ML
LVSV (TEICH): 25 ML
MCH RBC QN AUTO: 24.6 PG (ref 26.8–34.3)
MCHC RBC AUTO-ENTMCNC: 28.6 G/DL (ref 31.4–37.4)
MCV RBC AUTO: 86 FL (ref 82–98)
PLATELET # BLD AUTO: 221 THOUSANDS/UL (ref 149–390)
PMV BLD AUTO: 9.5 FL (ref 8.9–12.7)
POTASSIUM SERPL-SCNC: 4.3 MMOL/L (ref 3.5–5.3)
PROT SERPL-MCNC: 6.3 G/DL (ref 6.4–8.4)
RA PRESSURE ESTIMATED: 15 MMHG
RBC # BLD AUTO: 5.2 MILLION/UL (ref 3.81–5.12)
RIGHT VENTRICLE ID DIMENSION: 4.3 CM
RV PSP: 43 MMHG
SL CV LV EF: 50
SL CV PED ECHO LEFT VENTRICLE DIASTOLIC VOLUME (MOD BIPLANE) 2D: 69 ML
SL CV PED ECHO LEFT VENTRICLE SYSTOLIC VOLUME (MOD BIPLANE) 2D: 44 ML
SODIUM SERPL-SCNC: 136 MMOL/L (ref 135–147)
TR MAX PG: 28 MMHG
TR PEAK VELOCITY: 2.7 M/S
TRICUSPID VALVE PEAK REGURGITATION VELOCITY: 2.65 M/S
WBC # BLD AUTO: 9.2 THOUSAND/UL (ref 4.31–10.16)

## 2023-07-07 PROCEDURE — 93308 TTE F-UP OR LMTD: CPT | Performed by: INTERNAL MEDICINE

## 2023-07-07 PROCEDURE — 99232 SBSQ HOSP IP/OBS MODERATE 35: CPT | Performed by: INTERNAL MEDICINE

## 2023-07-07 PROCEDURE — 93308 TTE F-UP OR LMTD: CPT

## 2023-07-07 PROCEDURE — 93325 DOPPLER ECHO COLOR FLOW MAPG: CPT

## 2023-07-07 PROCEDURE — 82785 ASSAY OF IGE: CPT | Performed by: PHYSICIAN ASSISTANT

## 2023-07-07 PROCEDURE — 94640 AIRWAY INHALATION TREATMENT: CPT

## 2023-07-07 PROCEDURE — 94760 N-INVAS EAR/PLS OXIMETRY 1: CPT

## 2023-07-07 PROCEDURE — 93325 DOPPLER ECHO COLOR FLOW MAPG: CPT | Performed by: INTERNAL MEDICINE

## 2023-07-07 PROCEDURE — 93321 DOPPLER ECHO F-UP/LMTD STD: CPT | Performed by: INTERNAL MEDICINE

## 2023-07-07 PROCEDURE — 93321 DOPPLER ECHO F-UP/LMTD STD: CPT

## 2023-07-07 PROCEDURE — 86003 ALLG SPEC IGE CRUDE XTRC EA: CPT | Performed by: PHYSICIAN ASSISTANT

## 2023-07-07 PROCEDURE — 82948 REAGENT STRIP/BLOOD GLUCOSE: CPT

## 2023-07-07 PROCEDURE — 80053 COMPREHEN METABOLIC PANEL: CPT | Performed by: INTERNAL MEDICINE

## 2023-07-07 PROCEDURE — 94660 CPAP INITIATION&MGMT: CPT

## 2023-07-07 PROCEDURE — 85027 COMPLETE CBC AUTOMATED: CPT | Performed by: INTERNAL MEDICINE

## 2023-07-07 RX ORDER — HYDROMORPHONE HCL/PF 1 MG/ML
0.5 SYRINGE (ML) INJECTION EVERY 4 HOURS PRN
Status: DISCONTINUED | OUTPATIENT
Start: 2023-07-07 | End: 2023-07-08 | Stop reason: HOSPADM

## 2023-07-07 RX ORDER — DOXYCYCLINE HYCLATE 100 MG/1
100 CAPSULE ORAL EVERY 12 HOURS SCHEDULED
Status: DISCONTINUED | OUTPATIENT
Start: 2023-07-07 | End: 2023-07-08 | Stop reason: HOSPADM

## 2023-07-07 RX ORDER — METHYLPREDNISOLONE SODIUM SUCCINATE 40 MG/ML
40 INJECTION, POWDER, LYOPHILIZED, FOR SOLUTION INTRAMUSCULAR; INTRAVENOUS EVERY 12 HOURS SCHEDULED
Status: DISCONTINUED | OUTPATIENT
Start: 2023-07-07 | End: 2023-07-08 | Stop reason: HOSPADM

## 2023-07-07 RX ORDER — CALCIUM CARBONATE 500 MG/1
500 TABLET, CHEWABLE ORAL DAILY PRN
Status: DISCONTINUED | OUTPATIENT
Start: 2023-07-07 | End: 2023-07-08 | Stop reason: HOSPADM

## 2023-07-07 RX ORDER — INSULIN GLARGINE 100 [IU]/ML
10 INJECTION, SOLUTION SUBCUTANEOUS
Status: DISCONTINUED | OUTPATIENT
Start: 2023-07-07 | End: 2023-07-08 | Stop reason: HOSPADM

## 2023-07-07 RX ORDER — OXYCODONE HYDROCHLORIDE 10 MG/1
10 TABLET ORAL EVERY 4 HOURS PRN
Status: DISCONTINUED | OUTPATIENT
Start: 2023-07-07 | End: 2023-07-08 | Stop reason: HOSPADM

## 2023-07-07 RX ADMIN — HYDROXYCHLOROQUINE SULFATE 100 MG: 200 TABLET, FILM COATED ORAL at 16:50

## 2023-07-07 RX ADMIN — OFLOXACIN 1 DROP: 3 SOLUTION OPHTHALMIC at 11:24

## 2023-07-07 RX ADMIN — OFLOXACIN 1 DROP: 3 SOLUTION OPHTHALMIC at 08:44

## 2023-07-07 RX ADMIN — HEPARIN SODIUM 5000 UNITS: 5000 INJECTION INTRAVENOUS; SUBCUTANEOUS at 12:50

## 2023-07-07 RX ADMIN — CEFEPIME HYDROCHLORIDE 2000 MG: 2 INJECTION, SOLUTION INTRAVENOUS at 11:20

## 2023-07-07 RX ADMIN — OFLOXACIN 1 DROP: 3 SOLUTION OPHTHALMIC at 16:50

## 2023-07-07 RX ADMIN — GABAPENTIN 100 MG: 100 CAPSULE ORAL at 20:12

## 2023-07-07 RX ADMIN — METHYLPREDNISOLONE SODIUM SUCCINATE 40 MG: 40 INJECTION, POWDER, FOR SOLUTION INTRAMUSCULAR; INTRAVENOUS at 20:15

## 2023-07-07 RX ADMIN — AMLODIPINE BESYLATE 5 MG: 5 TABLET ORAL at 08:35

## 2023-07-07 RX ADMIN — PREDNISONE 40 MG: 20 TABLET ORAL at 08:35

## 2023-07-07 RX ADMIN — GABAPENTIN 100 MG: 100 CAPSULE ORAL at 08:35

## 2023-07-07 RX ADMIN — GABAPENTIN 100 MG: 100 CAPSULE ORAL at 16:24

## 2023-07-07 RX ADMIN — OXYCODONE HYDROCHLORIDE 10 MG: 10 TABLET ORAL at 20:12

## 2023-07-07 RX ADMIN — OXYCODONE HYDROCHLORIDE 10 MG: 10 TABLET ORAL at 05:58

## 2023-07-07 RX ADMIN — DOXYCYCLINE 100 MG: 100 CAPSULE ORAL at 16:42

## 2023-07-07 RX ADMIN — LEVALBUTEROL HYDROCHLORIDE 1.25 MG: 1.25 SOLUTION RESPIRATORY (INHALATION) at 13:52

## 2023-07-07 RX ADMIN — OXYCODONE HYDROCHLORIDE 10 MG: 10 TABLET ORAL at 12:52

## 2023-07-07 RX ADMIN — LEVALBUTEROL HYDROCHLORIDE 1.25 MG: 1.25 SOLUTION RESPIRATORY (INHALATION) at 20:27

## 2023-07-07 RX ADMIN — SILDENAFIL 20 MG: 20 TABLET ORAL at 08:35

## 2023-07-07 RX ADMIN — HYDROMORPHONE HYDROCHLORIDE 0.5 MG: 1 INJECTION, SOLUTION INTRAMUSCULAR; INTRAVENOUS; SUBCUTANEOUS at 22:33

## 2023-07-07 RX ADMIN — HYDROXYCHLOROQUINE SULFATE 100 MG: 200 TABLET, FILM COATED ORAL at 08:44

## 2023-07-07 RX ADMIN — OFLOXACIN 1 DROP: 3 SOLUTION OPHTHALMIC at 23:00

## 2023-07-07 RX ADMIN — VANCOMYCIN HYDROCHLORIDE 1000 MG: 1 INJECTION, SOLUTION INTRAVENOUS at 12:50

## 2023-07-07 RX ADMIN — UMECLIDINIUM BROMIDE AND VILANTEROL TRIFENATATE 1 PUFF: 62.5; 25 POWDER RESPIRATORY (INHALATION) at 08:44

## 2023-07-07 RX ADMIN — SIMETHICONE 80 MG: 80 TABLET, CHEWABLE ORAL at 21:30

## 2023-07-07 RX ADMIN — HYDROMORPHONE HYDROCHLORIDE 0.5 MG: 1 INJECTION, SOLUTION INTRAMUSCULAR; INTRAVENOUS; SUBCUTANEOUS at 09:13

## 2023-07-07 RX ADMIN — INSULIN GLARGINE 10 UNITS: 100 INJECTION, SOLUTION SUBCUTANEOUS at 22:33

## 2023-07-07 RX ADMIN — HYDROMORPHONE HYDROCHLORIDE 0.5 MG: 1 INJECTION, SOLUTION INTRAMUSCULAR; INTRAVENOUS; SUBCUTANEOUS at 16:27

## 2023-07-07 RX ADMIN — SILDENAFIL 20 MG: 20 TABLET ORAL at 16:24

## 2023-07-07 RX ADMIN — CALCIUM CARBONATE (ANTACID) CHEW TAB 500 MG 500 MG: 500 CHEW TAB at 23:16

## 2023-07-07 RX ADMIN — NICOTINE 7 MG: 7 PATCH, EXTENDED RELEASE TRANSDERMAL at 11:19

## 2023-07-07 RX ADMIN — SILDENAFIL 20 MG: 20 TABLET ORAL at 20:12

## 2023-07-07 RX ADMIN — HEPARIN SODIUM 5000 UNITS: 5000 INJECTION INTRAVENOUS; SUBCUTANEOUS at 05:07

## 2023-07-07 RX ADMIN — HEPARIN SODIUM 5000 UNITS: 5000 INJECTION INTRAVENOUS; SUBCUTANEOUS at 22:33

## 2023-07-07 RX ADMIN — NICOTINE 1 PATCH: 14 PATCH, EXTENDED RELEASE TRANSDERMAL at 08:38

## 2023-07-07 RX ADMIN — GUAIFENESIN 600 MG: 600 TABLET ORAL at 08:35

## 2023-07-07 RX ADMIN — INSULIN LISPRO 3 UNITS: 100 INJECTION, SOLUTION INTRAVENOUS; SUBCUTANEOUS at 16:50

## 2023-07-07 RX ADMIN — SIMETHICONE 80 MG: 80 TABLET, CHEWABLE ORAL at 16:27

## 2023-07-07 RX ADMIN — LEVALBUTEROL HYDROCHLORIDE 1.25 MG: 1.25 SOLUTION RESPIRATORY (INHALATION) at 07:22

## 2023-07-07 RX ADMIN — GUAIFENESIN 600 MG: 600 TABLET ORAL at 16:42

## 2023-07-07 NOTE — PROGRESS NOTES
[facility]  Progress Note  Name: Larry Garay  MRN: [id number]  Unit/Bed#: -01 I Date of Admission: 7/3/2023   Date of Service: 7/7/2023 I Hospital Day: 4    Assessment/Plan   * Acute exacerbation of chronic obstructive pulmonary disease (COPD) ([address] [facility])  Assessment & Plan  Background: History of mixed rheumatologic disease including Raynaud's and lupus with COPD recently relocated from [facility] to be with son here presented with shortness of breath and worsening erythema of right second finger  · Chronic respiratory failure/hypoxia  · Chronically on 3 L nasal cannula. Unfortunately still smoking. · Pulmonology following. Was IV methylprednisolone and being transitioned to prednisone. · Due to hypoxia pulmonology has ordered recheck echocardiogram    Cellulitis of right index finger  Assessment & Plan  · History of Raynaud's with multiple amputations in [place]  · Currently on vancomycin and cefepime for cellulitis of right second fingertip  · Given stability will de-escalate antibiotics to doxycycline  · Orthopedics consulted. If worsens will transfer for hand surgery but currently stable          Thrombocytopenia (HCC)  Assessment & Plan  · Mild thrombocytopenia on admission. Resolved    Results from last 7 days   Lab Units 07/07/23  0659 07/06/23  0854 07/05/23  0549 07/04/23  0524   PLATELETS Thousands/uL 221 203 251 199       Pre-diabetes  Assessment & Plan  · Prediabetes with steroid-induced hyperglycemia. · Added glargine with improvement.   Will decrease to 10 units tonight as steroids has been transitioned to oral    Results from last 7 days   Lab Units 07/07/23  1103 07/07/23  0702 07/06/23  2107 07/06/23  1602 07/06/23  1112 07/06/23  0751   POC GLUCOSE mg/dl 130 103 230* 269* 281* 210*       Lupus (HCC)  Assessment & Plan  · Systemic lupus on hydroxychloroquine  · Due to see rheumatology as an outpatient    Raynaud's disease  Assessment & Plan  · Raynaud's vs Buerger's disease multiple amputation of fingers in [place]  · Continue amlodipine and sildenafil    VTE Pharmacologic Prophylaxis: VTE Score: 5 High Risk (Score >/= 5) - Pharmacological DVT Prophylaxis Ordered: heparin. Sequential Compression Devices Ordered. Patient Centered Rounds: I have performed bedside rounds with nursing staff today. Discussions with Specialists or Other Care Team Provider: Case management and pulmonology    Education and Discussions with Family / Patient: Updated  (son) via phone. Time Spent for Care: This time was spent on one or more of the following: performing physical exam; counseling and coordination of care; obtaining or reviewing history; documenting in the medical record; reviewing/ordering tests, medications or procedures; communicating with other healthcare professionals and discussing with patient's family/caregivers. Current Length of Stay: 4 day(s)  Current Patient Status: Inpatient   Certification Statement: The patient will continue to require additional inpatient hospital stay due to echocardiogram and COPD exacerbation  Discharge Plan: Anticipate discharge in 24-48 hrs to home. Code Status: Level 3 - DNAR and DNI      Subjective:   Patient seen and examined. Bumped the finger this morning now having a lot of pain    Objective:   Vitals: Blood pressure 114/66, pulse 56, temperature 98.1 °F (36.7 °C), resp. rate 18, height 5' 1" (1.549 m), weight 77.7 kg (171 lb 3.2 oz), SpO2 100 %. Intake/Output Summary (Last 24 hours) at 7/7/2023 1130  Last data filed at 7/7/2023 0913  Gross per 24 hour   Intake 720 ml   Output 200 ml   Net 520 ml       Physical Exam  Vitals reviewed. Constitutional:       General: She is not in acute distress. Appearance: Normal appearance. HENT:      Head: Atraumatic. Cardiovascular:      Rate and Rhythm: Regular rhythm. Heart sounds: Normal heart sounds.    Pulmonary:      Breath sounds: Decreased breath sounds present. No wheezing. Abdominal:      General: Bowel sounds are normal.      Palpations: Abdomen is soft. Tenderness: There is no guarding or rebound. Musculoskeletal:         General: Deformity present. No swelling. Skin:     General: Skin is warm. Findings: Wound (Scabbing of distal second right finger) present. Neurological:      Mental Status: She is alert.    Psychiatric:         Mood and Affect: Mood normal.       Additional Data:   Labs:  Results from last 7 days   Lab Units 07/07/23  0659 07/06/23  0854 07/05/23  0549 07/04/23  0524 07/03/23 2040   WBC Thousand/uL 9.20 7.51 10.84* 3.97* 5.04   HEMOGLOBIN g/dL 12.8 13.5 13.1 12.9 14.2   PLATELETS Thousands/uL 221 203 251 199 123*   MCV fL 86 84 84 84 84   TOTAL NEUT ABS Thousand/uL  --   --   --  3.65  --    INR   --   --   --   --  0.89     Results from last 7 days   Lab Units 07/07/23  0524 07/06/23  0854 07/05/23  0549   SODIUM mmol/L 136 134* 138   POTASSIUM mmol/L 4.3 5.4* 4.6   CHLORIDE mmol/L 103 98 103   CO2 mmol/L 26 28 29   ANION GAP mmol/L 7 8 6   BUN mg/dL 25 25 24   CREATININE mg/dL 0.64 0.70 0.75   CALCIUM mg/dL 8.9 9.3 9.4   ALBUMIN g/dL 3.2* 3.7 3.7   TOTAL BILIRUBIN mg/dL 0.30 0.32 0.24   ALK PHOS U/L 40 46 49   ALT U/L 8 7 10   AST U/L 11* 17 10*   EGFR ml/min/1.73sq m 105 102 94   GLUCOSE RANDOM mg/dL 115 290* 195*     Results from last 7 days   Lab Units 07/03/23 2040   MAGNESIUM mg/dL 2.3         Results from last 7 days   Lab Units 07/04/23  0052 07/03/23 2236 07/03/23 2040   HS TNI 0HR ng/L  --   --  8   HS TNI 2HR ng/L  --  9  --    HS TNI 4HR ng/L 8  --   --           Results from last 7 days   Lab Units 07/04/23  0524 07/03/23  2040   LACTIC ACID mmol/L  --  1.6   PROCALCITONIN ng/ml <0.05 <0.05     Results from last 7 days   Lab Units 07/07/23  1103 07/07/23  0702 07/06/23  2107 07/06/23  1602 07/06/23  1112 07/06/23  0751 07/05/23  2101 07/05/23  1615 07/05/23  1110 07/05/23  0708 07/04/23 2139 07/04/23  1539   POC GLUCOSE mg/dl 130 103 230* 269* 281* 210* 283* 209* 298* 195* 151* 211*             * I Have Reviewed All Lab Data Listed Above. Cultures:   Results from last 7 days   Lab Units 07/04/23  0944 07/04/23  0020 07/03/23 2040 07/03/23 2029   BLOOD CULTURE   --   --  No Growth at 48 hrs. No Growth at 48 hrs. SPUTUM CULTURE  1+ Growth of  --   --   --    GRAM STAIN RESULT  Rare Epithelial cells per low power field  1+ Polys  No bacteria seen  --   --   --    URINE CULTURE   --  >100,000 cfu/ml Escherichia coli*  --   --    INFLUENZA A PCR   --   --   --  Negative       Results from last 7 days   Lab Units 07/03/23 2029   SARS-COV-2  Negative   INFLUENZA A PCR  Negative   INFLUENZA B PCR  Negative   RSV PCR  Negative           Lines/Drains:  Invasive Devices     Peripheral Intravenous Line  Duration           Peripheral IV 07/06/23 Right Antecubital <1 day              Telemetry:      Imaging:  Imaging Reports Reviewed Today Include:   XR chest 1 view portable    Result Date: 7/4/2023  Impression: No acute cardiopulmonary disease. Workstation performed: [id number]     CTA chest pe study    Result Date: 7/4/2023  Impression: No intraluminal filling defect to suggest an acute pulmonary embolus. Mild bilateral atelectatic and emphysematous lung changes with no focal infiltrate or pleural effusion. Workstation performed: [id number]     XR hand 2 vw right    Result Date: 7/4/2023  Impression: Mildly comminuted fracture at the distal tuft of the right second digit. Erosive/resorptive changes of the distal tuft of the right fourth digit which may reflect phalangeal osteolysis given history of Raynaud's disease.  Correlate with clinical exam. Workstation performed: [id number]       Scheduled Meds:  Current Facility-Administered Medications   Medication Dose Route Frequency Provider Last Rate   • acetaminophen  650 mg Oral Q6H PRN SAIDA Nava     • amLODIPine  5 mg Oral Daily SAIDA Guardado     • cefepime  2,000 mg Intravenous Q12H SAIDA Guardado 2,000 mg (07/07/23 1120)   • gabapentin  100 mg Oral TID Addie Cook MD     • guaiFENesin  600 mg Oral BID SAIDA Guardado     • heparin (porcine)  5,000 Units Subcutaneous [facility] SAIDA Guardado     • HYDROmorphone  0.5 mg Intravenous Q4H PRN Kaycee Mayes DO     • hydroxychloroquine  100 mg Oral BID Addie Cook MD     • insulin glargine  20 Units Subcutaneous HS Devang Finney DO     • insulin lispro  1-6 Units Subcutaneous TID AC SAIDA Guardado     • insulin lispro  1-6 Units Subcutaneous HS SAIDA Guardado     • insulin lispro  5 Units Subcutaneous TID With Meals Addie Cook MD     • ipratropium-albuterol  3 mL Nebulization Q6H PRN Addie Cook MD     • levalbuterol  1.25 mg Nebulization TID SAIDA Guardado     • lidocaine  1 patch Topical Daily Aurelia Piper MD     • methylPREDNISolone sodium succinate  40 mg Intravenous Q12H [address] Ellen Blunt MD     • nicotine  7 mg Transdermal Daily Ellen Blunt MD     • ofloxacin  1 drop Both Eyes 4x Daily Addie Cook MD     • oxyCODONE  10 mg Oral Q4H PRN Kaycee Mayes DO     • oxyCODONE  5 mg Oral Q4H PRN Kaycee Mayes DO     • sildenafil  20 mg Oral TID SAIDA Guardado     • simethicone  80 mg Oral Q6H PRN Addie Cook MD     • umeclidinium-vilanterol  1 puff Inhalation Daily Heath Tan PA-C     • vancomycin  1,000 mg Intravenous Q12H Addie Cook MD 1,000 mg (07/06/23 2216)       Today, Patient Was Seen By: Kaycee Mayes DO    ** Please Note: Dictation voice to text software may have been used in the creation of this document.  **

## 2023-07-07 NOTE — ASSESSMENT & PLAN NOTE
· Raynaud's vs Buerger's disease multiple amputation of fingers in [place]  · Continue amlodipine and sildenafil

## 2023-07-07 NOTE — ASSESSMENT & PLAN NOTE
· History of Raynaud's with multiple amputations in [place]  · Currently on vancomycin and cefepime for cellulitis of right second fingertip  · Given stability will de-escalate antibiotics to doxycycline  · Orthopedics consulted.   If worsens will transfer for hand surgery but currently stable

## 2023-07-07 NOTE — PLAN OF CARE
Problem: PAIN - ADULT  Goal: Verbalizes/displays adequate comfort level or baseline comfort level  Description: Interventions:  - Encourage patient to monitor pain and request assistance  - Assess pain using appropriate pain scale  - Administer analgesics based on type and severity of pain and evaluate response  - Implement non-pharmacological measures as appropriate and evaluate response  - Consider cultural and social influences on pain and pain management  - Notify physician/advanced practitioner if interventions unsuccessful or patient reports new pain  Outcome: Progressing     Problem: INFECTION - ADULT  Goal: Absence or prevention of progression during hospitalization  Description: INTERVENTIONS:  - Assess and monitor for signs and symptoms of infection  - Monitor lab/diagnostic results  - Monitor all insertion sites, i.e. indwelling lines, tubes, and drains  - Monitor endotracheal if appropriate and nasal secretions for changes in amount and color  - [place] appropriate cooling/warming therapies per order  - Administer medications as ordered  - Instruct and encourage patient and family to use good hand hygiene technique  - Identify and instruct in appropriate isolation precautions for identified infection/condition  Outcome: Progressing     Problem: SAFETY ADULT  Goal: Patient will remain free of falls  Description: INTERVENTIONS:  - Educate patient/family on patient safety including physical limitations  - Instruct patient to call for assistance with activity   - Consult OT/PT to assist with strengthening/mobility   - Keep Call bell within reach  - Keep bed low and locked with side rails adjusted as appropriate  - Keep care items and personal belongings within reach  - Initiate and maintain comfort rounds  - Make Fall Risk Sign visible to staff    - Apply yellow socks and bracelet for high fall risk patients  - Consider moving patient to room near nurses station  Outcome: Progressing  Goal: Maintain or return to baseline ADL function  Description: INTERVENTIONS:  -  Assess patient's ability to carry out ADLs; assess patient's baseline for ADL function and identify physical deficits which impact ability to perform ADLs (bathing, care of mouth/teeth, toileting, grooming, dressing, etc.)  - Assess/evaluate cause of self-care deficits   - Assess range of motion  - Assess patient's mobility; develop plan if impaired  - Assess patient's need for assistive devices and provide as appropriate  - Encourage maximum independence but intervene and supervise when necessary  - Involve family in performance of ADLs  - Assess for home care needs following discharge   - Consider OT consult to assist with ADL evaluation and planning for discharge  - Provide patient education as appropriate  Outcome: Progressing  Goal: Maintains/Returns to pre admission functional level  Description: INTERVENTIONS:  - Perform BMAT or MOVE assessment daily.   - Set and communicate daily mobility goal to care team and patient/family/caregiver.    - Collaborate with rehabilitation services on mobility goals if consulted    - Out of bed for toileting  - Record patient progress and toleration of activity level   Outcome: Progressing     Problem: DISCHARGE PLANNING  Goal: Discharge to home or other facility with appropriate resources  Description: INTERVENTIONS:  - Identify barriers to discharge w/patient and caregiver  - Arrange for needed discharge resources and transportation as appropriate  - Identify discharge learning needs (meds, wound care, etc.)  - Arrange for interpretive services to assist at discharge as needed  - Refer to Case Management Department for coordinating discharge planning if the patient needs post-hospital services based on physician/advanced practitioner order or complex needs related to functional status, cognitive ability, or social support system  Outcome: Progressing     Problem: Knowledge Deficit  Goal: Patient/family/caregiver demonstrates understanding of disease process, treatment plan, medications, and discharge instructions  Description: Complete learning assessment and assess knowledge base. Interventions:  - Provide teaching at level of understanding  - Provide teaching via preferred learning methods  Outcome: Progressing     Problem: MOBILITY - ADULT  Goal: Maintain or return to baseline ADL function  Description: INTERVENTIONS:  -  Assess patient's ability to carry out ADLs; assess patient's baseline for ADL function and identify physical deficits which impact ability to perform ADLs (bathing, care of mouth/teeth, toileting, grooming, dressing, etc.)  - Assess/evaluate cause of self-care deficits   - Assess range of motion  - Assess patient's mobility; develop plan if impaired  - Assess patient's need for assistive devices and provide as appropriate  - Encourage maximum independence but intervene and supervise when necessary  - Involve family in performance of ADLs  - Assess for home care needs following discharge   - Consider OT consult to assist with ADL evaluation and planning for discharge  - Provide patient education as appropriate  Outcome: Progressing  Goal: Maintains/Returns to pre admission functional level  Description: INTERVENTIONS:  - Perform BMAT or MOVE assessment daily.   - Set and communicate daily mobility goal to care team and patient/family/caregiver.    - Collaborate with rehabilitation services on mobility goals if consulted    - Out of bed for toileting  - Record patient progress and toleration of activity level   Outcome: Progressing

## 2023-07-07 NOTE — PROGRESS NOTES
Progress Note - Pulmonary   Liz Dunbar 50 y.o. female MRN: [id number]  Unit/Bed#: -01 Encounter: [id number]    Assessment:  1. Chronic hypoxic respiratory failure, at baseline. 2. COPD of undetermined severity with acute exacerbation. 3. Suspected Buerger's syndrome. 4. Reported history of scleroderma and lupus. 5. Rule out pulmonary hypertension. Plan:  Although wheezing improved, there is poor air movement overall and patient still clinically unwell, will place back on solumedrol starting tonight, 40 q12h (received prednisone already this AM). Counseled that she must stop smoking immediately. Would try to reduce nicotine patch to 7mg to match her actual nicotine intake. Although she may need BIPAP outpatient we will have to assess her for qualification at a later time when she is not in a COPD exacerbation, will d/c order for now. Will order echo to assess pulmonary pressure given history of scleroderma, and will order bubble study for hypoxemia out of proportion to lung disease. Continue sildenafil 20 TID for now. Will continue to follow if in-hospital next week. Chief Complaint:   "I'm still having a lot of trouble."    Subjective:   Patient seen after ambulating to bathroom, very dyspneic, and desaturated on home 3L. She has a harsh cough but no phlegm production. Objective:     Vitals: Blood pressure 114/66, pulse 56, temperature 98.1 °F (36.7 °C), resp. rate 18, height 5' 1" (1.549 m), weight 77.7 kg (171 lb 3.2 oz), SpO2 97 %. ,Body mass index is 32.35 kg/m². Intake/Output Summary (Last 24 hours) at 7/7/2023 0923  Last data filed at 7/7/2023 0846  Gross per 24 hour   Intake 480 ml   Output 200 ml   Net 280 ml       Invasive Devices     Peripheral Intravenous Line  Duration           Peripheral IV 07/06/23 Right Antecubital <1 day              Physical Exam:   General:  No acute distress. Alert and oriented x 3. HEENT:  PERRL.  MMM.   Chest:  Clear, no wheezing, but severely diminished air movement. Cardiovascular:  S1 + S2, RRR, no M/R/G. Extremities:  No significant edema. Multiple distal phalanx amputations. Right 2nd distal phalanx with necrotic appearance. Neuro:  No focal deficits, grossly intact. Psych:  Normal mood and affect. Labs: I have personally reviewed pertinent lab results.   Imaging and other studies: I have personally reviewed pertinent films in PACS

## 2023-07-07 NOTE — ASSESSMENT & PLAN NOTE
· Mild thrombocytopenia on admission.   Resolved    Results from last 7 days   Lab Units 07/07/23  0659 07/06/23  0854 07/05/23  0549 07/04/23  0524   PLATELETS Thousands/uL 221 203 251 199

## 2023-07-07 NOTE — RESPIRATORY THERAPY NOTE
RT Ventilator Management Note  Ifeoma Garcia 50 y.o. female MRN: [id number]  Unit/Bed#: -01 Encounter: [id number]      Daily Screen    No data found in the last 10 encounters. Physical Exam:   Subjective Data: placed on hours of sleep CPAP at 0041 at patient's request.  Patient felt discomfort from her IV and asked that mask be withheld until IV was finished.   Patient tolerated CPAP well sleeping through the night      Resp Comments: stable on 3L

## 2023-07-07 NOTE — ASSESSMENT & PLAN NOTE
· Prediabetes with steroid-induced hyperglycemia. · Added glargine with improvement.   Will decrease to 10 units tonight as steroids has been transitioned to oral    Results from last 7 days   Lab Units 07/07/23  1103 07/07/23  0702 07/06/23  2107 07/06/23  1602 07/06/23  1112 07/06/23  0751   POC GLUCOSE mg/dl 130 103 230* 269* 281* 210*

## 2023-07-07 NOTE — CASE MANAGEMENT
Case Management Assessment & Discharge Planning Note    Patient name Jaren Metz  Location /-01 MRN [id number]  : 1975 Date 2023       Current Admission Date: 7/3/2023  Current Admission Diagnosis:Acute exacerbation of chronic obstructive pulmonary disease (COPD) ([address])   Patient Active Problem List    Diagnosis Date Noted   • Cellulitis of right index finger    • Pre-diabetes    • Thrombocytopenia (HCC)    • Chest pain    • Raynaud's disease    • Lupus ([address])    • Acute exacerbation of chronic obstructive pulmonary disease (COPD) ([address])    • Chronic respiratory failure (HCC)       LOS (days): 4  Geometric Mean LOS (GMLOS) (days):   Days to GMLOS:     OBJECTIVE:    Risk of Unplanned Readmission Score: 11         Current admission status: Inpatient       Preferred Pharmacy: No Pharmacies Listed  Primary Care Provider: Toan Burt DO    Primary Insurance: JEAN BUCKLEY PENDING  Secondary Insurance:     ASSESSMENT:  [facility] Proxies    There are no active Health Care Proxies on file. Patient Information  Admitted from[de-identified] Home  Mental Status: Alert  During Assessment patient was accompanied by: Not accompanied during assessment  Assessment information provided by[de-identified] Patient  Primary Caregiver: Self  Support Systems: Hema Rosales  [place] of Residence: [facility]  entry access options.  Select all that apply.: Stairs  Number of steps to enter home.: 6  Type of Current Residence: [address] [place] home  Upon entering residence, is there a bedroom on the main floor (no further steps)?: No  A bedroom is located on the following floor levels of residence (select all that apply):: 3rd Floor  Upon entering residence, is there a bathroom on the main floor (no further steps)?: Yes  Number of steps to 3rd floor from main floor: Two Flights  In the last 12 months, was there a time when you were not able to pay the mortgage or rent on time?: No  In the last 12 months, how many places have you lived?: 1  In the last 12 months, was there a time when you did not have a steady place to sleep or slept in a shelter (including now)?: No  Living Arrangements: Lives w/ Friend    Activities of Daily Living Prior to Admission  Functional Status: Independent  Completes ADLs independently?: Yes  Ambulates independently?: Yes  Does patient use assisted devices?: Yes  Assisted Devices (DME) used: [employer] Old  Does patient currently own DME?: Yes  What DME does the patient currently own?: Komal Old  Does patient have a history of Outpatient Therapy (PT/OT)?: No  Does the patient have a history of Short-Term Rehab?: No  Does patient have a history of HHC?: No  Does patient currently have [facility]?: No         Patient Information Continued  Income Source: SSI/SSD  Does patient have prescription coverage?: No (pt sts she had [place] Medicare and is "in process" of having it switch to [place])  Within the past 12 months, you worried that your food would run out before you got the money to buy more.: Never true  Within the past 12 months, the food you bought just didn't last and you didn't have money to get more.: Never true  Food insecurity resource given?: No  Does patient receive dialysis treatments?: No  Does patient have a history of substance abuse?: No  Does patient have a history of Mental Health Diagnosis?: No         Means of Transportation  Means of Transport to Appts[de-identified] Friends  In the past 12 months, has lack of transportation kept you from medical appointments or from getting medications?: No  In the past 12 months, has lack of transportation kept you from meetings, work, or from getting things needed for daily living?: No        DISCHARGE DETAILS:    Discharge planning discussed with[de-identified] patient  Freedom of Choice: Yes     CM contacted family/caregiver?: No- see comments (declined)             Contacts  Patient Contacts: friend, Li Maher  Relationship to Patient[de-identified] Family                   Would you like to participate in our [address] [employer] service program?  : No - Declined                    Pt recently moved to [place] from [facility] in [place]. Pt sts she is "in the process" of [place]. Pt sts she mailed in the paperwork "last week" and is anticipating her insurance will be changed soon. Pt lives in [facility] with [address] [place] with a friend,friend's boyfriend,  friend's 2 children, friend's 2 grandchildren and pts son. Pt sts her bed is on 3rd floor with 2 FF of steps. Pt sts she uses a walker for ambulation, independent for ADL's. Pt does not drive, friend provides transport. Pt utilizes home O2 3L at all times, pt gets her home O2 from [employer]. Pt sts she is currently paying out of pocket for O2, $105/month  Pt receives SSD, $916.00/month  Pt is currently paying cash for medications, pt us using [facility].   Pt asking for paper scripts at time of discharge, as she is unsure what pharmacy she will be using at time of discharge   Pt sts her friend is available to transport pt home at time of discharge

## 2023-07-07 NOTE — PROGRESS NOTES
João Matthews is a 50 y.o. female who is currently ordered Vancomycin IV with management by the Pharmacy Consult service. Relevant clinical data and objective / subjective history reviewed. Vancomycin Assessment:  Indication and Goal AUC/Trough: Soft tissue (goal -600, trough >10)  Clinical Status: stable  Micro:     Renal Function:  SCr: 0.64 mg/dL  CrCl: 101.5 mL/min  Renal replacement: Not on dialysis  Days of Therapy: 5  Current Dose: 1000mg IV Q12H  Vancomycin Plan:  New Dosing: continue 1000mg IV Q12H  Estimated AUC: 477 mcg*hr/mL  Estimated Trough: 14.3 mcg/mL  Next Level: 7/11/23 @ 0600  Renal Function Monitoring: Daily BMP and UOP  Pharmacy will continue to follow closely for s/sx of nephrotoxicity, infusion reactions and appropriateness of therapy. BMP and CBC will be ordered per protocol. We will continue to follow the patient’s culture results and clinical progress daily.     Amber Tubbs, Pharmacist

## 2023-07-08 VITALS
BODY MASS INDEX: 32.28 KG/M2 | WEIGHT: 171 LBS | OXYGEN SATURATION: 66 % | DIASTOLIC BLOOD PRESSURE: 71 MMHG | HEART RATE: 95 BPM | RESPIRATION RATE: 19 BRPM | TEMPERATURE: 97.7 F | SYSTOLIC BLOOD PRESSURE: 125 MMHG | HEIGHT: 61 IN

## 2023-07-08 PROBLEM — Z59.89 DOES NOT HAVE HEALTH INSURANCE: Status: ACTIVE | Noted: 2023-07-08

## 2023-07-08 LAB
GLUCOSE SERPL-MCNC: 147 MG/DL (ref 65–140)
GLUCOSE SERPL-MCNC: 261 MG/DL (ref 65–140)

## 2023-07-08 PROCEDURE — 94640 AIRWAY INHALATION TREATMENT: CPT

## 2023-07-08 PROCEDURE — 82948 REAGENT STRIP/BLOOD GLUCOSE: CPT

## 2023-07-08 PROCEDURE — 94760 N-INVAS EAR/PLS OXIMETRY 1: CPT

## 2023-07-08 PROCEDURE — 99239 HOSP IP/OBS DSCHRG MGMT >30: CPT | Performed by: INTERNAL MEDICINE

## 2023-07-08 RX ORDER — OXYCODONE HYDROCHLORIDE 10 MG/1
10 TABLET ORAL EVERY 6 HOURS PRN
Qty: 30 TABLET | Refills: 0 | Status: SHIPPED | OUTPATIENT
Start: 2023-07-08

## 2023-07-08 RX ORDER — DOXYCYCLINE HYCLATE 100 MG/1
100 CAPSULE ORAL EVERY 12 HOURS SCHEDULED
Qty: 10 CAPSULE | Refills: 0 | Status: SHIPPED | OUTPATIENT
Start: 2023-07-08 | End: 2023-07-13

## 2023-07-08 RX ORDER — PREDNISONE 10 MG/1
TABLET ORAL
Qty: 30 TABLET | Refills: 0 | Status: SHIPPED | OUTPATIENT
Start: 2023-07-08

## 2023-07-08 RX ORDER — GABAPENTIN 300 MG/1
300 CAPSULE ORAL 2 TIMES DAILY
Qty: 60 CAPSULE | Refills: 0 | Status: SHIPPED | OUTPATIENT
Start: 2023-07-08

## 2023-07-08 RX ORDER — FLUTICASONE PROPIONATE AND SALMETEROL 113; 14 UG/1; UG/1
1 POWDER, METERED RESPIRATORY (INHALATION) 2 TIMES DAILY
Qty: 1 EACH | Refills: 2 | Status: SHIPPED | OUTPATIENT
Start: 2023-07-08

## 2023-07-08 RX ADMIN — GUAIFENESIN 600 MG: 600 TABLET ORAL at 08:04

## 2023-07-08 RX ADMIN — LIDOCAINE 1 PATCH: 50 PATCH CUTANEOUS at 08:02

## 2023-07-08 RX ADMIN — OFLOXACIN 1 DROP: 3 SOLUTION OPHTHALMIC at 11:58

## 2023-07-08 RX ADMIN — INSULIN LISPRO 3 UNITS: 100 INJECTION, SOLUTION INTRAVENOUS; SUBCUTANEOUS at 11:57

## 2023-07-08 RX ADMIN — HYDROXYCHLOROQUINE SULFATE 100 MG: 200 TABLET, FILM COATED ORAL at 08:04

## 2023-07-08 RX ADMIN — DOXYCYCLINE 100 MG: 100 CAPSULE ORAL at 08:04

## 2023-07-08 RX ADMIN — SILDENAFIL 20 MG: 20 TABLET ORAL at 08:03

## 2023-07-08 RX ADMIN — HEPARIN SODIUM 5000 UNITS: 5000 INJECTION INTRAVENOUS; SUBCUTANEOUS at 06:41

## 2023-07-08 RX ADMIN — METHYLPREDNISOLONE SODIUM SUCCINATE 40 MG: 40 INJECTION, POWDER, FOR SOLUTION INTRAMUSCULAR; INTRAVENOUS at 08:03

## 2023-07-08 RX ADMIN — OXYCODONE HYDROCHLORIDE 5 MG: 5 TABLET ORAL at 12:14

## 2023-07-08 RX ADMIN — HYDROMORPHONE HYDROCHLORIDE 0.5 MG: 1 INJECTION, SOLUTION INTRAMUSCULAR; INTRAVENOUS; SUBCUTANEOUS at 06:38

## 2023-07-08 RX ADMIN — LEVALBUTEROL HYDROCHLORIDE 1.25 MG: 1.25 SOLUTION RESPIRATORY (INHALATION) at 07:38

## 2023-07-08 RX ADMIN — GABAPENTIN 100 MG: 100 CAPSULE ORAL at 08:04

## 2023-07-08 RX ADMIN — OFLOXACIN 1 DROP: 3 SOLUTION OPHTHALMIC at 08:07

## 2023-07-08 RX ADMIN — AMLODIPINE BESYLATE 5 MG: 5 TABLET ORAL at 08:04

## 2023-07-08 RX ADMIN — UMECLIDINIUM BROMIDE AND VILANTEROL TRIFENATATE 1 PUFF: 62.5; 25 POWDER RESPIRATORY (INHALATION) at 08:07

## 2023-07-08 RX ADMIN — NICOTINE 7 MG: 7 PATCH, EXTENDED RELEASE TRANSDERMAL at 08:05

## 2023-07-08 RX ADMIN — OXYCODONE HYDROCHLORIDE 10 MG: 10 TABLET ORAL at 02:53

## 2023-07-08 NOTE — PLAN OF CARE
Problem: MOBILITY - ADULT  Goal: Maintain or return to baseline ADL function  Description: INTERVENTIONS:  -  Assess patient's ability to carry out ADLs; assess patient's baseline for ADL function and identify physical deficits which impact ability to perform ADLs (bathing, care of mouth/teeth, toileting, grooming, dressing, etc.)  - Assess/evaluate cause of self-care deficits   - Assess range of motion  - Assess patient's mobility; develop plan if impaired  - Assess patient's need for assistive devices and provide as appropriate  - Encourage maximum independence but intervene and supervise when necessary  - Involve family in performance of ADLs  - Assess for home care needs following discharge   - Consider OT consult to assist with ADL evaluation and planning for discharge  - Provide patient education as appropriate  Outcome: Progressing     Problem: PAIN - ADULT  Goal: Verbalizes/displays adequate comfort level or baseline comfort level  Description: Interventions:  - Encourage patient to monitor pain and request assistance  - Assess pain using appropriate pain scale  - Administer analgesics based on type and severity of pain and evaluate response  - Implement non-pharmacological measures as appropriate and evaluate response  - Consider cultural and social influences on pain and pain management  - Notify physician/advanced practitioner if interventions unsuccessful or patient reports new pain  Outcome: Progressing

## 2023-07-08 NOTE — ASSESSMENT & PLAN NOTE
· History of Raynaud's with multiple amputations in [place]  · Currently on vancomycin and cefepime for cellulitis of right second fingertip  · Given stability de-escalated antibiotics to doxycycline  · Orthopedics consulted and patient needs to follow-up with hand surgery as an outpatient  · Discharge: Doxycycline for 5 more days.

## 2023-07-08 NOTE — ASSESSMENT & PLAN NOTE
· Moved here abruptly from [facility] and has a lapse in [place]  · Currently self paying for medications and oxygen  · Patient needs to complete paperwork as an outpatient and reapply for [place]

## 2023-07-08 NOTE — PLAN OF CARE
Problem: PAIN - ADULT  Goal: Verbalizes/displays adequate comfort level or baseline comfort level  Description: Interventions:  - Encourage patient to monitor pain and request assistance  - Assess pain using appropriate pain scale  - Administer analgesics based on type and severity of pain and evaluate response  - Implement non-pharmacological measures as appropriate and evaluate response  - Consider cultural and social influences on pain and pain management  - Notify physician/advanced practitioner if interventions unsuccessful or patient reports new pain  Outcome: Progressing     Problem: INFECTION - ADULT  Goal: Absence or prevention of progression during hospitalization  Description: INTERVENTIONS:  - Assess and monitor for signs and symptoms of infection  - Monitor lab/diagnostic results  - Monitor all insertion sites, i.e. indwelling lines, tubes, and drains  - Monitor endotracheal if appropriate and nasal secretions for changes in amount and color  - Tina appropriate cooling/warming therapies per order  - Administer medications as ordered  - Instruct and encourage patient and family to use good hand hygiene technique  - Identify and instruct in appropriate isolation precautions for identified infection/condition  Outcome: Progressing     Problem: SAFETY ADULT  Goal: Patient will remain free of falls  Description: INTERVENTIONS:  - Educate patient/family on patient safety including physical limitations  - Instruct patient to call for assistance with activity   - Consult OT/PT to assist with strengthening/mobility   - Keep Call bell within reach  - Keep bed low and locked with side rails adjusted as appropriate  - Keep care items and personal belongings within reach  - Initiate and maintain comfort rounds  - Make Fall Risk Sign visible to staff    - Apply yellow socks and bracelet for high fall risk patients  - Consider moving patient to room near nurses station  Outcome: Progressing  Goal: Maintain or return to baseline ADL function  Description: INTERVENTIONS:  -  Assess patient's ability to carry out ADLs; assess patient's baseline for ADL function and identify physical deficits which impact ability to perform ADLs (bathing, care of mouth/teeth, toileting, grooming, dressing, etc.)  - Assess/evaluate cause of self-care deficits   - Assess range of motion  - Assess patient's mobility; develop plan if impaired  - Assess patient's need for assistive devices and provide as appropriate  - Encourage maximum independence but intervene and supervise when necessary  - Involve family in performance of ADLs  - Assess for home care needs following discharge   - Consider OT consult to assist with ADL evaluation and planning for discharge  - Provide patient education as appropriate  Outcome: Progressing  Goal: Maintains/Returns to pre admission functional level  Description: INTERVENTIONS:  - Perform BMAT or MOVE assessment daily.   - Set and communicate daily mobility goal to care team and patient/family/caregiver.    - Collaborate with rehabilitation services on mobility goals if consulted  - Out of bed for toileting  - Record patient progress and toleration of activity level   Outcome: Progressing     Problem: DISCHARGE PLANNING  Goal: Discharge to home or other facility with appropriate resources  Description: INTERVENTIONS:  - Identify barriers to discharge w/patient and caregiver  - Arrange for needed discharge resources and transportation as appropriate  - Identify discharge learning needs (meds, wound care, etc.)  - Arrange for interpretive services to assist at discharge as needed  - Refer to Case Management Department for coordinating discharge planning if the patient needs post-hospital services based on physician/advanced practitioner order or complex needs related to functional status, cognitive ability, or social support system  Outcome: Progressing

## 2023-07-08 NOTE — RESPIRATORY THERAPY NOTE
RT Protocol Note  Liz Dunbar 50 y.o. female MRN: [id number]  Unit/Bed#: -01 Encounter: [id number]    Assessment    Principal Problem:    Acute exacerbation of chronic obstructive pulmonary disease (COPD) ([address])  Active Problems:    Raynaud's disease    Lupus ([address])    Cellulitis of right index finger    Pre-diabetes    Thrombocytopenia (HCC)    Chest pain      Home Pulmonary Medications:  Albuterol MDI  Home Devices/Therapy: Home O2    Past Medical History:   Diagnosis Date   • Borderline diabetic    • Cardiopulmonary arrest ([facility] [address])    • COPD (chronic obstructive pulmonary disease) (HCC)    • Lupus (HCC)    • Raynaud's disease    • Scleroderma ([address])      Social History     Socioeconomic History   • Marital status: Single     Spouse name: None   • Number of children: None   • Years of education: None   • Highest education level: None   Occupational History   • None   Tobacco Use   • Smoking status: Every Day     Packs/day: 0.50     Types: Cigarettes   • Smokeless tobacco: Never   Vaping Use   • Vaping Use: Never used   Substance and Sexual Activity   • Alcohol use: Never   • Drug use: Never   • Sexual activity: None   Other Topics Concern   • None   Social History Narrative   • None     Social Determinants of Health     Financial Resource Strain: Not on file   Food Insecurity: No Food Insecurity (7/7/2023)    Hunger Vital Sign    • Worried About Running Out of Food in the Last Year: Never true    • Ran Out of Food in the Last Year: Never true   Transportation Needs: No Transportation Needs (7/7/2023)    PRAPARE - Transportation    • Lack of Transportation (Medical): No    • Lack of Transportation (Non-Medical):  No   Physical Activity: Not on file   Stress: Not on file   Social Connections: Not on file   Intimate Partner Violence: Not on file   Housing Stability: Low Risk  (7/7/2023)    Housing Stability Vital Sign    • Unable to Pay for Housing in the Last Year: No    • Number of [address] in the Last Year: 1 • Unstable Housing in the Last Year: No       Subjective    Subjective Data: denies shortness of breath or additional work of breathing. Slept soundly through the night without CPAP    Objective    Physical Exam:   Assessment Type: Pre-treatment  General Appearance: Alert, Awake  Respiratory Pattern: Dyspnea with exertion  Chest Assessment: Chest expansion symmetrical  Bilateral Breath Sounds: Diminished  Cough: Non-productive  O2 Device: 3L nasal canula    Vitals:  Blood pressure 116/71, pulse 69, temperature 97.5 °F (36.4 °C), resp. rate 18, height 5' 1" (1.549 m), weight 77.6 kg (171 lb), SpO2 94 %. Imaging and other studies: There are mild bilateral atelectatic lung changes with no infiltrate or pleural effusion. Mild emphysematous lung changes are present.  No pneumothorax    O2 Device: 3L nasal canula     Plan    Respiratory Plan: Mild Distress pathway        Resp Comments: stable on 3L

## 2023-07-08 NOTE — DISCHARGE SUMMARY
[address] [facility]  Discharge- Shameka Ocampo 1975, 50 y.o. female MRN: [id number]  Unit/Bed#: -Barb Encounter: [id number]  Primary Care Provider: Ricardo Marrero DO   Date and time admitted to hospital: 7/3/2023  8:28 PM    Admitting Provider:  Nemo Bateman MD  Discharge Provider:  Saleem Novoa DO  Admission Date: 7/3/2023       Discharge Date: 07/08/23   LOS: 5  Primary Care Physician at Discharge: Ricardo Marrero [phone]    DISCHARGE DIAGNOSES  * Acute exacerbation of chronic obstructive pulmonary disease (COPD) ([address] [facility])  Assessment & Plan  Background: History of mixed rheumatologic disease including Raynaud's and lupus with COPD recently relocated from [address] [place] [address] to  with son here presented with shortness of breath and worsening erythema of right second finger  · Chronic respiratory failure/hypoxia  · Chronically on 3 L nasal cannula. Unfortunately still smoking. · Pulmonology following. Was IV methylprednisolone and being transitioned to prednisone. · Due to hypoxia pulmonology has ordered recheck echocardiogram which did not demonstrate any intracardiac shunting  · Discharge: Prednisone taper as well as a prescription for air duo as this will be more affordable for the patient with good Rx    Cellulitis of right index finger  Assessment & Plan  · History of Raynaud's with multiple amputations in [place]  · Currently on vancomycin and cefepime for cellulitis of right second fingertip  · Given stability de-escalated antibiotics to doxycycline  · Orthopedics consulted and patient needs to follow-up with hand surgery as an outpatient  · Discharge: Doxycycline for 5 more days.           Does not have health insurance  Assessment & Plan  · Moved here abruptly from [facility] and has a lapse in [place]  · Currently self paying for medications and oxygen  · Patient needs to complete paperwork as an outpatient and reapply for Medicaid    Thrombocytopenia [facility])  Assessment & Plan  · Mild thrombocytopenia on admission. Resolved    Results from last 7 days   Lab Units 07/07/23  0659 07/06/23  0854 07/05/23  0549 07/04/23  0524   PLATELETS Thousands/uL 221 203 251 199       Pre-diabetes  Assessment & Plan  · Prediabetes with steroid-induced hyperglycemia. · Added glargine with improvement. · Should improve with tapering of steroids. 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 Pt with hx of copd, and uses 3L at home chronically, was still feeling short of breath. Pt reports using inhaler at home with no relief. Pt given 3 duo nebs and 40mg of Solu-medrol by ems PTA)    HOSPITAL COURSE:  Jaren Metz is a 50 y.o. female with a history of lupus Raynaud's COPD and prediabetes who presented to the hospital with worsening shortness of breath. She recently relocated to this area to live near her son due to family issues. She had not obtained after insurance coverage/Medicaid since being in [place]. She has had multiple fingers amputated. She was seen by pulmonology for COPD. Steroids were tapered. Echocardiogram was checked to rule out intracardiac shunting given the amount of hypoxia she had. Orthopedics was consulted for finger cellulitis. This improved and she did not have any drainage or signs of necrosis. She is being discharged today in good condition with pain medications and needs close follow-up with rheumatology and hand surgery. The case was discussed with son on telephone on day of discharge. Please see problem list listed above. CONSULTING PROVIDERS   IP CONSULT TO ORTHOPEDIC SURGERY  IP CONSULT TO PULMONOLOGY    PROCEDURES PERFORMED  Echo follow up/limited w/ contrast if indicated    Result Date: 7/7/2023  Narrative: •  Left Ventricle: Left ventricular cavity size is normal. Wall thickness is normal. The left ventricular ejection fraction is 50%. Systolic function is low normal. Wall motion is normal. Diastolic function was not assessed as this was a limited study. •  Right Ventricle: Right ventricular cavity size is mildly dilated. Systolic function is normal visually. •  Atrial Septum: No patent foramen ovale detected at rest and with provocation. •  Mitral Valve: There is mild regurgitation. There is no evidence of stenosis. •  Tricuspid Valve: There is trace to mild regurgitation. There is no evidence of stenosis. The right ventricular systolic pressure is mildly elevated. The estimated right ventricular systolic pressure is 20.22 mmHg. •  Aorta: The aortic root is normal in size. The aortic root is 3.20 cm. •  Prior TTE study available for comparison. Prior study date: 6/14/2023. Changes noted when compared to prior study. Changes include: EF on prior study noted to be 55-59% with mild concentric hypertrophy, mild diastolic dysfunction and mild tricuspid regurgitation. RV size was noted to be normal with reduced RV function and moderate pulmonary hypertension (51 mmHg). . Prior report is scanned under the media tab. RADIOLOGY RESULTS  XR chest 1 view portable    Result Date: 7/4/2023  Impression: No acute cardiopulmonary disease.  Workstation performed: [id number]     CTA chest pe study    Result Date: 7/4/2023  Impression: No intraluminal filling defect to suggest an acute pulmonary embolus. Mild bilateral atelectatic and emphysematous lung changes with no focal infiltrate or pleural effusion. Workstation performed: [id number]     XR hand 2 vw right    Result Date: 7/4/2023  Impression: Mildly comminuted fracture at the distal tuft of the right second digit. Erosive/resorptive changes of the distal tuft of the right fourth digit which may reflect phalangeal osteolysis given history of Raynaud's disease.  Correlate with clinical exam. Workstation performed: [id number]       LABS  Results from last 7 days   Lab Units 07/07/23  0659 07/06/23  0854 07/05/23  0549 07/04/23  0524 07/03/23  2040   WBC Thousand/uL 9.20 7.51 10.84* 3.97* 5.04   HEMOGLOBIN g/dL 12.8 13.5 13.1 12.9 14.2   HEMATOCRIT % 44.7 46.2* 44.8 44.5 49.5*   MCV fL 86 84 84 84 84   TOTAL NEUT ABS Thousand/uL  --   --   --  3.65  --    PLATELETS Thousands/uL 221 203 251 199 123*   INR   --   --   --   --  0.89     Results from last 7 days   Lab Units 07/07/23  0524 07/06/23  0854 07/05/23  0549 07/04/23  0524 07/03/23  2040   SODIUM mmol/L 136 134* 138 137 141   POTASSIUM mmol/L 4.3 5.4* 4.6 3.9 4.3   CHLORIDE mmol/L 103 98 103 104 102   CO2 mmol/L 26 28 29 27 31   BUN mg/dL 25 25 24 15 16   CREATININE mg/dL 0.64 0.70 0.75 0.63 0.75   CALCIUM mg/dL 8.9 9.3 9.4 8.9 8.9   ALBUMIN g/dL 3.2* 3.7 3.7 3.5 3.7   TOTAL BILIRUBIN mg/dL 0.30 0.32 0.24 0.25 0.36   ALK PHOS U/L 40 46 49 46 54   ALT U/L 8 7 10 4* 4*   AST U/L 11* 17 10* 8* 24   EGFR ml/min/1.73sq m 105 102 94 106 94   GLUCOSE RANDOM mg/dL 115 290* 195* 213* 151*         Results from last 7 days   Lab Units 07/04/23  0052 07/03/23  2236 07/03/23  2040   HS TNI 0HR ng/L  --   --  8   HS TNI 2HR ng/L  --  9  --    HS TNI 4HR ng/L 8  --   --           Results from last 7 days   Lab Units 07/03/23  2040   D-DIMER QUANTITATIVE ug/ml FEU 1.40*     Results from last 7 days   Lab Units 07/08/23  6099 07/07/23  2102 07/07/23  1612 07/07/23  1103 07/07/23  7029 07/06/23  2107 07/06/23  1602 07/06/23  1112 07/06/23  0751 07/05/23 2101   POC GLUCOSE mg/dl 147* 124 255* 130 103 230* 269* 281* 210* 283*             Results from last 7 days   Lab Units 07/04/23  0524 07/03/23 2040   LACTIC ACID mmol/L  --  1.6   PROCALCITONIN ng/ml <0.05 <0.05           Cultures:   Results from last 7 days   Lab Units 07/04/23  0020   COLOR UA  Yellow   CLARITY UA  Clear   SPEC GRAV UA  >=1.030   PH UA  5.5   LEUKOCYTES UA  Negative   NITRITE UA  Positive*   GLUCOSE UA mg/dl 250 (1/4%)*   KETONES UA mg/dl Negative   BILIRUBIN UA  Negative   BLOOD UA  Negative      Results from last 7 days   Lab Units 07/04/23  0020   RBC UA /hpf 0-1   WBC UA /hpf 20-30*   EPITHELIAL CELLS WET PREP /hpf Occasional   BACTERIA UA /hpf Innumerable*      Results from last 7 days   Lab Units 07/04/23  0944 07/04/23  0020 07/03/23 2040 07/03/23 2029   BLOOD CULTURE   --   --  No Growth at 72 hrs. No Growth at 72 hrs. SPUTUM CULTURE  1+ Growth of  --   --   --    GRAM STAIN RESULT  Rare Epithelial cells per low power field  1+ Polys  No bacteria seen  --   --   --    URINE CULTURE   --  >100,000 cfu/ml Escherichia coli*  --   --    INFLUENZA A PCR   --   --   --  Negative     Results from last 7 days   Lab Units 07/03/23 2029   SARS-COV-2  Negative   INFLUENZA A PCR  Negative   INFLUENZA B PCR  Negative   RSV PCR  Negative             DISCHARGE DAY VISIT AND PHYSICAL EXAM:  Subjective: Patient seen and examined. Pain better controlled with oxycodone 10 mg. Vitals:   Blood Pressure: 125/71 (07/08/23 0720)  Pulse: 95 (07/08/23 0900)  Temperature: 97.7 °F (36.5 °C) (07/08/23 0720)  Temp Source: Oral (07/03/23 2034)  Respirations: 19 (07/08/23 0720)  Height: 5' 1" (154.9 cm) (07/07/23 0730)  Weight - Scale: 77.6 kg (171 lb) (07/07/23 0730)  SpO2: (!) 66 % (07/08/23 0900)    Physical Exam  Vitals reviewed. Constitutional:       General: She is not in acute distress. Appearance: Normal appearance. HENT:      Head: Atraumatic. Cardiovascular:      Rate and Rhythm: Regular rhythm. Heart sounds: Normal heart sounds. Pulmonary:      Breath sounds: Decreased breath sounds present. No wheezing. Abdominal:      General: Bowel sounds are normal.      Palpations: Abdomen is soft. Tenderness: There is no guarding or rebound. Musculoskeletal:         General: Deformity (Status post multiple finger amputations) present. No swelling. Skin:     Comments: No further erythema of distal right second digit   Neurological:      Mental Status: She is alert. Mental status is at baseline. Psychiatric:         Mood and Affect: Mood normal.       Planned Re-admission: No  Discharge Disposition: Home/Self Care    Test Results Pending at Discharge:   Pending Labs     Order Current Status    [facility] Allergy Panel, Adult In process    Blood culture #1 Preliminary result    Blood culture #2 Preliminary result      Incidental findings:     Medications   · Discharge Medication List: See after visit summary for reconciled discharge medications. Diet restrictions: Cardiac diabetic diet  Activity restrictions: No strenuous activity  Discharge Condition: stable    Outpatient Follow-Up and Discharge Instructions  See after visit summary section titled Discharge Instructions for information provided to patient and family. Code Status: Level 3 - DNAR and DNI  Discharge Statement   I spent 35 minutes discharging the patient. This time was spent on the day of discharge. Greater than 50% of total time was spent with the patient and / or family counseling and / or coordination of care. ** Please Note: This note has been constructed using a voice recognition system.  **

## 2023-07-08 NOTE — ASSESSMENT & PLAN NOTE
· Prediabetes with steroid-induced hyperglycemia. · Added glargine with improvement. · Should improve with tapering of steroids.     Results from last 7 days   Lab Units 07/08/23  1108 07/08/23  0718 07/07/23  2102 07/07/23  1612 07/07/23  1103 07/07/23  0702   POC GLUCOSE mg/dl 261* 147* 124 255* 130 103

## 2023-07-08 NOTE — ASSESSMENT & PLAN NOTE
Background: History of mixed rheumatologic disease including Raynaud's and lupus with COPD recently relocated from [facility] to  with son here presented with shortness of breath and worsening erythema of right second finger  · Chronic respiratory failure/hypoxia  · Chronically on 3 L nasal cannula. Unfortunately still smoking. · Pulmonology following. Was IV methylprednisolone and being transitioned to prednisone.   · Due to hypoxia pulmonology has ordered recheck echocardiogram which did not demonstrate any intracardiac shunting  · Discharge: Prednisone taper as well as a prescription for air duo as this will be more affordable for the patient with good Rx

## 2023-07-08 NOTE — ASSESSMENT & PLAN NOTE
· Raynaud's vs Buerger's disease multiple amputation of fingers in [place]  · Continue amlodipine and sildenafil  · Discharge:  We will give prescription for 30 tablets of oxycodone 10 mg to use given multiple amputations and finger pains

## 2023-07-09 LAB
BACTERIA BLD CULT: NORMAL
BACTERIA BLD CULT: NORMAL

## 2023-07-11 LAB
A ALTERNATA IGE QN: <0.1 KUA/I
A FUMIGATUS IGE QN: <0.1 KUA/I
BERMUDA GRASS IGE QN: <0.1 KUA/I
BOXELDER IGE QN: <0.1 KUA/I
C HERBARUM IGE QN: <0.1 KUA/I
CAT DANDER IGE QN: <0.1 KUA/I
COTTONWOOD IGE QN: <0.1 KUA/I
D FARINAE IGE QN: <0.1 KUA/I
D PTERONYSS IGE QN: <0.1 KUA/I
DOG DANDER IGE QN: <0.1 KUA/I
LONDON PLANE IGE QN: <0.1 KUA/I
MOUSE URINE PROT IGE QN: <0.1 KUA/I
MT JUNIPER IGE QN: <0.1 KUA/I
P NOTATUM IGE QN: <0.1 KUA/I
ROACH IGE QN: <0.1 KUA/I
SILVER BIRCH IGE QN: <0.1 KUA/I
TIMOTHY IGE QN: <0.1 KUA/I
TOTAL IGE SMQN RAST: 30.5 KU/L (ref 0–113)
WALNUT IGE QN: <0.1 KUA/I
WHITE ASH IGE QN: <0.1 KUA/I
WHITE ELM IGE QN: <0.1 KUA/I
WHITE MULBERRY IGE QN: <0.1 KUA/I
WHITE OAK IGE QN: <0.1 KUA/I

## 2023-07-13 ENCOUNTER — HOSPITAL ENCOUNTER (EMERGENCY)
Facility: HOSPITAL | Age: 48
Discharge: HOME | End: 2023-07-13
Attending: EMERGENCY MEDICINE
Payer: COMMERCIAL

## 2023-07-13 VITALS
SYSTOLIC BLOOD PRESSURE: 122 MMHG | WEIGHT: 175 LBS | BODY MASS INDEX: 33.07 KG/M2 | TEMPERATURE: 98 F | RESPIRATION RATE: 22 BRPM | DIASTOLIC BLOOD PRESSURE: 63 MMHG | HEART RATE: 100 BPM | OXYGEN SATURATION: 95 %

## 2023-07-13 DIAGNOSIS — Z89.021 S/P SURGICAL AMPUTATION OF FINGER, RIGHT: ICD-10-CM

## 2023-07-13 DIAGNOSIS — M79.646 FINGER PAIN: Primary | ICD-10-CM

## 2023-07-13 DIAGNOSIS — I73.01 RAYNAUD'S DISEASE WITH GANGRENE (HCC): ICD-10-CM

## 2023-07-13 DIAGNOSIS — L03.011 CELLULITIS OF RIGHT INDEX FINGER: ICD-10-CM

## 2023-07-13 LAB
ANION GAP SERPL CALCULATED.3IONS-SCNC: 8 MMOL/L
BASOPHILS # BLD AUTO: 0.02 THOUSANDS/ÂΜL (ref 0–0.1)
BASOPHILS NFR BLD AUTO: 0 % (ref 0–1)
BUN SERPL-MCNC: 21 MG/DL (ref 5–25)
CALCIUM SERPL-MCNC: 9 MG/DL (ref 8.4–10.2)
CHLORIDE SERPL-SCNC: 97 MMOL/L (ref 96–108)
CO2 SERPL-SCNC: 31 MMOL/L (ref 21–32)
CREAT SERPL-MCNC: 0.79 MG/DL (ref 0.6–1.3)
EOSINOPHIL # BLD AUTO: 0.09 THOUSAND/ÂΜL (ref 0–0.61)
EOSINOPHIL NFR BLD AUTO: 1 % (ref 0–6)
ERYTHROCYTE [DISTWIDTH] IN BLOOD BY AUTOMATED COUNT: 19.6 % (ref 11.6–15.1)
GFR SERPL CREATININE-BSD FRML MDRD: 88 ML/MIN/1.73SQ M
GLUCOSE SERPL-MCNC: 214 MG/DL (ref 65–140)
HCT VFR BLD AUTO: 45.8 % (ref 34.8–46.1)
HGB BLD-MCNC: 13.7 G/DL (ref 11.5–15.4)
IMM GRANULOCYTES # BLD AUTO: 0.07 THOUSAND/UL (ref 0–0.2)
IMM GRANULOCYTES NFR BLD AUTO: 1 % (ref 0–2)
LACTATE SERPL-SCNC: 1.1 MMOL/L (ref 0.5–2)
LYMPHOCYTES # BLD AUTO: 1.1 THOUSANDS/ÂΜL (ref 0.6–4.47)
LYMPHOCYTES NFR BLD AUTO: 13 % (ref 14–44)
MCH RBC QN AUTO: 24.9 PG (ref 26.8–34.3)
MCHC RBC AUTO-ENTMCNC: 29.9 G/DL (ref 31.4–37.4)
MCV RBC AUTO: 83 FL (ref 82–98)
MONOCYTES # BLD AUTO: 0.76 THOUSAND/ÂΜL (ref 0.17–1.22)
MONOCYTES NFR BLD AUTO: 9 % (ref 4–12)
NEUTROPHILS # BLD AUTO: 6.43 THOUSANDS/ÂΜL (ref 1.85–7.62)
NEUTS SEG NFR BLD AUTO: 76 % (ref 43–75)
NRBC BLD AUTO-RTO: 0 /100 WBCS
PLATELET # BLD AUTO: 231 THOUSANDS/UL (ref 149–390)
PMV BLD AUTO: 9.6 FL (ref 8.9–12.7)
POTASSIUM SERPL-SCNC: 4.1 MMOL/L (ref 3.5–5.3)
RBC # BLD AUTO: 5.5 MILLION/UL (ref 3.81–5.12)
SODIUM SERPL-SCNC: 136 MMOL/L (ref 135–147)
WBC # BLD AUTO: 8.47 THOUSAND/UL (ref 4.31–10.16)

## 2023-07-13 PROCEDURE — 85025 COMPLETE CBC W/AUTO DIFF WBC: CPT | Performed by: EMERGENCY MEDICINE

## 2023-07-13 PROCEDURE — 87040 BLOOD CULTURE FOR BACTERIA: CPT | Performed by: EMERGENCY MEDICINE

## 2023-07-13 PROCEDURE — 93005 ELECTROCARDIOGRAM TRACING: CPT

## 2023-07-13 PROCEDURE — 80048 BASIC METABOLIC PNL TOTAL CA: CPT | Performed by: EMERGENCY MEDICINE

## 2023-07-13 PROCEDURE — 36415 COLL VENOUS BLD VENIPUNCTURE: CPT | Performed by: EMERGENCY MEDICINE

## 2023-07-13 PROCEDURE — 83605 ASSAY OF LACTIC ACID: CPT | Performed by: EMERGENCY MEDICINE

## 2023-07-13 RX ORDER — HYDROMORPHONE HCL/PF 1 MG/ML
1 SYRINGE (ML) INJECTION ONCE
Status: COMPLETED | OUTPATIENT
Start: 2023-07-13 | End: 2023-07-13

## 2023-07-13 RX ADMIN — HYDROMORPHONE HYDROCHLORIDE 1 MG: 1 INJECTION, SOLUTION INTRAMUSCULAR; INTRAVENOUS; SUBCUTANEOUS at 20:36

## 2023-07-14 ENCOUNTER — TELEPHONE (OUTPATIENT)
Dept: OBGYN CLINIC | Facility: HOSPITAL | Age: 48
End: 2023-07-14

## 2023-07-14 LAB
ATRIAL RATE: 109 BPM
P AXIS: 50 DEGREES
PR INTERVAL: 116 MS
QRS AXIS: 75 DEGREES
QRSD INTERVAL: 74 MS
QT INTERVAL: 318 MS
QTC INTERVAL: 428 MS
T WAVE AXIS: 62 DEGREES
VENTRICULAR RATE: 109 BPM

## 2023-07-14 NOTE — TELEPHONE ENCOUNTER
Patient is being referred to a orthopedics. Please schedule accordingly.     [address]   [phone]

## 2023-07-14 NOTE — ED PROVIDER NOTES
History  Chief Complaint   Patient presents with   • Finger Injury     Pt reports distal end of R index finger fell off today. 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, she was recently admitted to the hospital for upper respiratory illness, states after returning home the distal portion of her finger that was necrotic fell off, and she is having some increased pain, she is still taking antibiotics for this, denies any fevers, on arrival she does report shortness of breath, this is because she supposed to be on 3 L of oxygen via nasal cannula, however does not have portable oxygen at this time and therefore has been off of the oxygen for the half hour ride here, when she was placed on oxygen in the room her oxygen returned to a normal level and she reported feeling much better          Prior to Admission Medications   Prescriptions Last Dose Informant Patient Reported? Taking?    Hydroxychloroquine Sulfate (PLAQUENIL PO)   Yes No   Sig: Take 100 mg by mouth 2 (two) times a day   albuterol (PROVENTIL HFA,VENTOLIN HFA) 90 mcg/act inhaler   Yes No   Sig: Inhale 2 puffs every 6 (six) hours as needed for wheezing   amLODIPine (NORVASC) 5 mg tablet   Yes No   Sig: Take 5 mg by mouth daily   doxycycline hyclate (VIBRAMYCIN) 100 mg capsule   No No   Sig: Take 1 capsule (100 mg total) by mouth every 12 (twelve) hours for 5 days   fluticasone-salmeterol (AirDuo RespiClick 386/86) 667-20 mcg/act dry powder inhaler   No No   Sig: Inhale 1 puff 2 (two) times a day Rinse mouth after use.   gabapentin (NEURONTIN) 300 mg capsule   No No   Sig: Take 1 capsule (300 mg total) by mouth 2 (two) times a day   oxyCODONE (ROXICODONE) 10 MG TABS   No No   Sig: Take 1 tablet (10 mg total) by mouth every 6 (six) hours as needed Result reviewed, result normal or expected, please call patient for severe pain Max Daily Amount: 40 mg   predniSONE 10 mg tablet   No No   Sig: Days 1-3: 4 tablets daily; Days 4-6: 3 tablets daily; Days 7-9: 2 tablets daily; Days 10-12: 1 tablet daily then stop   sildenafil (REVATIO) 20 mg tablet   Yes No   Sig: Take 20 mg by mouth 3 (three) times a day      Facility-Administered Medications: None       Past Medical History:   Diagnosis Date   • Borderline diabetic    • Cardiopulmonary arrest ([facility])    • COPD (chronic obstructive pulmonary disease) ([facility])    • Lupus ([facility])    • Raynaud's disease    • Scleroderma ([facility])        Past Surgical History:   Procedure Laterality Date   • FINGER AMPUTATION Bilateral     multiple       History reviewed. No pertinent family history. I have reviewed and agree with the history as documented. E-Cigarette/Vaping   • E-Cigarette Use Never User      E-Cigarette/Vaping Substances     Social History     Tobacco Use   • Smoking status: Every Day     Packs/day: 0.50     Types: Cigarettes   • Smokeless tobacco: Never   Vaping Use   • Vaping Use: Never used   Substance Use Topics   • Alcohol use: Never   • Drug use: Never       Review of Systems   Constitutional: Negative. HENT: Negative. Eyes: Negative. Respiratory: Negative. Cardiovascular: Negative. Gastrointestinal: Negative. Endocrine: Negative. Genitourinary: Negative. Musculoskeletal: Positive for arthralgias. Skin: Positive for color change. Allergic/Immunologic: Negative. Neurological: Negative. Hematological: Negative. Psychiatric/Behavioral: Negative. Physical Exam  Physical Exam  Constitutional:       Appearance: She is well-developed. HENT:      Head: Normocephalic and atraumatic. Eyes:      Conjunctiva/sclera: Conjunctivae normal.      Pupils: Pupils are equal, round, and reactive to light. Cardiovascular:      Rate and Rhythm: Tachycardia present. Heart sounds: Normal heart sounds.    Pulmonary:      Effort: Pulmonary effort is normal.   Abdominal:      Palpations: Abdomen is soft. Musculoskeletal:         General: Normal range of motion. Hands:       Cervical back: Normal range of motion and neck supple. Comments: Distal amputation of the right second finger, mild erythema distally, no drainage, no fluctuance, tender to palpate   Skin:     General: Skin is warm and dry. Neurological:      Mental Status: She is alert and oriented to person, place, and time. Vital Signs  ED Triage Vitals [07/13/23 2014]   Temperature Pulse Respirations Blood Pressure SpO2   98 °F (36.7 °C) (!) 120 (!) 24 126/79 94 %      Temp Source Heart Rate Source Patient Position - Orthostatic VS BP Location FiO2 (%)   Temporal Monitor Lying Right arm --      Pain Score       9           Vitals:    07/13/23 2014 07/13/23 2130 07/13/23 2138   BP: 126/79 122/63    Pulse: (!) 120 99 100   Patient Position - Orthostatic VS: Lying Sitting          Visual Acuity      ED Medications  Medications   HYDROmorphone (DILAUDID) injection 1 mg (1 mg Intravenous Given 7/13/23 2036)       Diagnostic Studies  Results Reviewed     Procedure Component Value Units Date/Time    Lactic acid, plasma (w/reflex if result > 2.0) [[id number]]  (Normal) Collected: 07/13/23 2029    Lab Status: Final result Specimen: Blood from Arm, Left Updated: 07/13/23 2110     LACTIC ACID 1.1 mmol/L     Narrative:      Result may be elevated if tourniquet was used during collection.     Basic metabolic panel [[id number]]  (Abnormal) Collected: 07/13/23 2029    Lab Status: Final result Specimen: Blood from Arm, Left Updated: 07/13/23 2110     Sodium 136 mmol/L      Potassium 4.1 mmol/L      Chloride 97 mmol/L      CO2 31 mmol/L      ANION GAP 8 mmol/L      BUN 21 mg/dL      Creatinine 0.79 mg/dL      Glucose 214 mg/dL      Calcium 9.0 mg/dL      eGFR 88 ml/min/1.73sq m     Narrative:      [place] guidelines for Chronic Kidney Disease (CKD):   •  Stage 1 with normal or high GFR (GFR > 90 mL/min/1.73 square meters)  •  Stage 2 Mild CKD (GFR = 60-89 mL/min/1.73 square meters)  •  Stage 3A Moderate CKD (GFR = 45-59 mL/min/1.73 square meters)  •  Stage 3B Moderate CKD (GFR = 30-44 mL/min/1.73 square meters)  •  Stage 4 Severe CKD (GFR = 15-29 mL/min/1.73 square meters)  •  Stage 5 End Stage CKD (GFR <15 mL/min/1.73 square meters)  Note: GFR calculation is accurate only with a steady state creatinine    CBC and differential [[id number]]  (Abnormal) Collected: 07/13/23 2029    Lab Status: Final result Specimen: Blood from Arm, Left Updated: 07/13/23 2042     WBC 8.47 Thousand/uL      RBC 5.50 Million/uL      Hemoglobin 13.7 g/dL      Hematocrit 45.8 %      MCV 83 fL      MCH 24.9 pg      MCHC 29.9 g/dL      RDW 19.6 %      MPV 9.6 fL      Platelets 981 Thousands/uL      nRBC 0 /100 WBCs      Neutrophils Relative 76 %      Immat GRANS % 1 %      Lymphocytes Relative 13 %      Monocytes Relative 9 %      Eosinophils Relative 1 %      Basophils Relative 0 %      Neutrophils Absolute 6.43 Thousands/µL      Immature Grans Absolute 0.07 Thousand/uL      Lymphocytes Absolute 1.10 Thousands/µL      Monocytes Absolute 0.76 Thousand/µL      Eosinophils Absolute 0.09 Thousand/µL      Basophils Absolute 0.02 Thousands/µL     Blood culture #1 [[id number]] Collected: 07/13/23 2029    Lab Status: In process Specimen: Blood from Arm, Left Updated: 07/13/23 2039    Blood culture #2 [[id number]] Collected: 07/13/23 2035    Lab Status: In process Specimen: Blood from Arm, Right Updated: 07/13/23 2039                 No orders to display              Procedures  Procedures         ED Course  ED Course as of 07/13/23 2323   Thu Jul 13, 2023   1647 Basic metabolic panel(!)   6452 Lactic acid, plasma (w/reflex if result > 2.0)   2322 CBC and differential(!)                               [facility] Most Recent Value   Initial Alcohol Screen: US AUDIT-C     1.  How often do you have a drink containing alcohol? 0 Filed at: 07/13/2023 2040   2. How many drinks containing alcohol do you have on a typical day you are drinking? 0 Filed at: 07/13/2023 2040   3b. FEMALE Any Age, or MALE 65+: How often do you have 4 or more drinks on one occassion? 0 Filed at: 07/13/2023 2040   Audit-C Score 0 Filed at: 07/13/2023 2040   KARLA: How many times in the past year have you. .. Used an illegal drug or used a prescription medication for non-medical reasons? Never Filed at: 07/13/2023 2040                    Medical Decision Making  Patient presents with initial presentation for local erythema, warmth and swelling concerning for cellulitis of right second finger which has had a previous amputation. there is sensitivity/slight tenderness to light touch around the erythematous area. No lymphangitic spread visible and no fluid pockets or fluctuance concerning for abscess noted. Patient recently admitted and this issue was addressed while she was in the hospital, she is currently taking doxycycline. Her main concern tonight was increased pain. She has not had the opportunity to follow-up with hand surgeon as an outpatient. Initially patient was noted to be hypoxic, however she was without her oxygen which she is supposed to wear 24 hours a day, she has a history of COPD, she quit smoking since her most recent admission. Patient to be discharged home with advice to continue oral antibiotics and close follow-up with PMD, referral was placed for follow-up with hand surgery as well. Cellulitis of right index finger: chronic illness or injury  Finger pain: chronic illness or injury  Raynaud's disease with gangrene [facility]): chronic illness or injury  S/P surgical amputation of finger, right: chronic illness or injury  Amount and/or Complexity of Data Reviewed  Labs: ordered. Risk  OTC drugs. Prescription drug management.           Disposition  Final diagnoses:   Finger pain   Cellulitis of right index finger S/P surgical amputation of finger, right   Raynaud's disease with gangrene [facility])     Time reflects when diagnosis was documented in both MDM as applicable and the Disposition within this note     Time User Action Codes Description Comment    7/13/2023  9:38 PM Chelsy Felling Add [G63.097] Finger pain     7/13/2023  9:43 PM Chelsy Felling Add [M41.415] Cellulitis of right index finger     7/13/2023  9:43 PM Chelsy Felling Add [Y32.254] S/P surgical amputation of finger, right     7/13/2023  9:43 PM Chelsy Felling Add [I73.01] Raynaud's disease with gangrene [facility])       ED Disposition     ED Disposition   Discharge    Condition   Stable    Date/Time   Thu Jul 13, 2023  9:38 PM    Comment   Karlee Cools discharge to home/self care.                Follow-up Information     Follow up With Specialties Details Why Contact Info    Anu Kelly MD Orthopedic Surgery, Hand Surgery In 1 week  [address] [place] [address]  [phone]            Discharge Medication List as of 7/13/2023  9:43 PM      CONTINUE these medications which have NOT CHANGED    Details   albuterol (PROVENTIL HFA,VENTOLIN HFA) 90 mcg/act inhaler Inhale 2 puffs every 6 (six) hours as needed for wheezing, Historical Med      amLODIPine (NORVASC) 5 mg tablet Take 5 mg by mouth daily, Historical Med      doxycycline hyclate (VIBRAMYCIN) 100 mg capsule Take 1 capsule (100 mg total) by mouth every 12 (twelve) hours for 5 days, Starting Sat 7/8/2023, Until Thu 7/13/2023, Print      fluticasone-salmeterol (AirDuo RespiClick 462/95) 041-51 mcg/act dry powder inhaler Inhale 1 puff 2 (two) times a day Rinse mouth after use., Starting Sat 7/8/2023, Print      gabapentin (NEURONTIN) 300 mg capsule Take 1 capsule (300 mg total) by mouth 2 (two) times a day, Starting Sat 7/8/2023, Print      Hydroxychloroquine Sulfate (PLAQUENIL PO) Take 100 mg by mouth 2 (two) times a day, Historical Med      oxyCODONE (ROXICODONE) 10 MG TABS Take 1 tablet (10 mg total) by mouth every 6 (six) hours as needed for severe pain Max Daily Amount: 40 mg, Starting Sat 7/8/2023, Normal      predniSONE 10 mg tablet Days 1-3: 4 tablets daily; Days 4-6: 3 tablets daily; Days 7-9: 2 tablets daily; Days 10-12: 1 tablet daily then stop, Print      sildenafil (REVATIO) 20 mg tablet Take 20 mg by mouth 3 (three) times a day, Historical Med                 PDMP Review     None          ED Provider  Electronically Signed by           King Zuluaga DO  07/13/23 1255

## 2023-07-16 LAB
BACTERIA BLD CULT: NORMAL
BACTERIA BLD CULT: NORMAL

## 2023-07-17 LAB
ATRIAL RATE: 96 BPM
P AXIS: 75 DEGREES
PR INTERVAL: 124 MS
QRS AXIS: 71 DEGREES
QRSD INTERVAL: 80 MS
QT INTERVAL: 348 MS
QTC INTERVAL: 439 MS
T WAVE AXIS: 72 DEGREES
VENTRICULAR RATE: 96 BPM

## 2023-07-17 PROCEDURE — 93010 ELECTROCARDIOGRAM REPORT: CPT | Performed by: INTERNAL MEDICINE

## 2023-07-19 LAB
BACTERIA BLD CULT: NORMAL
BACTERIA BLD CULT: NORMAL